# Patient Record
Sex: FEMALE | Race: BLACK OR AFRICAN AMERICAN | NOT HISPANIC OR LATINO | Employment: OTHER | URBAN - METROPOLITAN AREA
[De-identification: names, ages, dates, MRNs, and addresses within clinical notes are randomized per-mention and may not be internally consistent; named-entity substitution may affect disease eponyms.]

---

## 2024-02-04 ENCOUNTER — HOSPITAL ENCOUNTER (INPATIENT)
Facility: CLINIC | Age: 39
LOS: 2 days | Discharge: HOME | DRG: 769 | End: 2024-02-06
Attending: OBSTETRICS & GYNECOLOGY | Admitting: OBSTETRICS & GYNECOLOGY

## 2024-02-04 LAB
ABO/RH(D): NORMAL
ANTIBODY SCREEN: NEGATIVE
APTT PPP: 30 SECONDS (ref 22–38)
D DIMER PPP FEU-MCNC: 3.78 UG/ML FEU (ref 0–0.5)
ERYTHROCYTE [DISTWIDTH] IN BLOOD BY AUTOMATED COUNT: 15.5 % (ref 10–15)
FERRITIN SERPL-MCNC: 11 NG/ML (ref 6–175)
FIBRINOGEN PPP-MCNC: 441 MG/DL (ref 170–490)
HBV SURFACE AB SERPL IA-ACNC: 3.55 M[IU]/ML
HBV SURFACE AB SERPL IA-ACNC: NONREACTIVE M[IU]/ML
HBV SURFACE AG SERPL QL IA: NONREACTIVE
HCT VFR BLD AUTO: 25.4 % (ref 35–47)
HCV AB SERPL QL IA: NONREACTIVE
HGB BLD-MCNC: 8 G/DL (ref 11.7–15.7)
HIV 1+2 AB+HIV1 P24 AG SERPL QL IA: NONREACTIVE
HOLD SPECIMEN: NORMAL
MCH RBC QN AUTO: 25.7 PG (ref 26.5–33)
MCHC RBC AUTO-ENTMCNC: 31.5 G/DL (ref 31.5–36.5)
MCV RBC AUTO: 82 FL (ref 78–100)
PLATELET # BLD AUTO: 147 10E3/UL (ref 150–450)
RBC # BLD AUTO: 3.11 10E6/UL (ref 3.8–5.2)
SPECIMEN EXPIRATION DATE: NORMAL
WBC # BLD AUTO: 13.3 10E3/UL (ref 4–11)

## 2024-02-04 PROCEDURE — 36415 COLL VENOUS BLD VENIPUNCTURE: CPT

## 2024-02-04 PROCEDURE — 59414 DELIVER PLACENTA: CPT | Mod: GC | Performed by: OBSTETRICS & GYNECOLOGY

## 2024-02-04 PROCEDURE — 87340 HEPATITIS B SURFACE AG IA: CPT

## 2024-02-04 PROCEDURE — 82728 ASSAY OF FERRITIN: CPT

## 2024-02-04 PROCEDURE — 86780 TREPONEMA PALLIDUM: CPT

## 2024-02-04 PROCEDURE — 85610 PROTHROMBIN TIME: CPT

## 2024-02-04 PROCEDURE — 85027 COMPLETE CBC AUTOMATED: CPT

## 2024-02-04 PROCEDURE — 85384 FIBRINOGEN ACTIVITY: CPT

## 2024-02-04 PROCEDURE — 250N000011 HC RX IP 250 OP 636

## 2024-02-04 PROCEDURE — 250N000013 HC RX MED GY IP 250 OP 250 PS 637

## 2024-02-04 PROCEDURE — 86803 HEPATITIS C AB TEST: CPT

## 2024-02-04 PROCEDURE — 85379 FIBRIN DEGRADATION QUANT: CPT

## 2024-02-04 PROCEDURE — 88307 TISSUE EXAM BY PATHOLOGIST: CPT | Mod: TC

## 2024-02-04 PROCEDURE — 86762 RUBELLA ANTIBODY: CPT

## 2024-02-04 PROCEDURE — 86706 HEP B SURFACE ANTIBODY: CPT

## 2024-02-04 PROCEDURE — 87389 HIV-1 AG W/HIV-1&-2 AB AG IA: CPT

## 2024-02-04 PROCEDURE — 88307 TISSUE EXAM BY PATHOLOGIST: CPT | Mod: 26 | Performed by: PATHOLOGY

## 2024-02-04 PROCEDURE — 120N000002 HC R&B MED SURG/OB UMMC

## 2024-02-04 PROCEDURE — 10D17Z9 MANUAL EXTRACTION OF PRODUCTS OF CONCEPTION, RETAINED, VIA NATURAL OR ARTIFICIAL OPENING: ICD-10-PCS | Performed by: OBSTETRICS & GYNECOLOGY

## 2024-02-04 PROCEDURE — 250N000009 HC RX 250

## 2024-02-04 PROCEDURE — 86900 BLOOD TYPING SEROLOGIC ABO: CPT

## 2024-02-04 PROCEDURE — 85730 THROMBOPLASTIN TIME PARTIAL: CPT

## 2024-02-04 RX ORDER — HYDROCORTISONE 25 MG/G
CREAM TOPICAL 3 TIMES DAILY PRN
Status: DISCONTINUED | OUTPATIENT
Start: 2024-02-04 | End: 2024-02-06 | Stop reason: HOSPADM

## 2024-02-04 RX ORDER — CARBOPROST TROMETHAMINE 250 UG/ML
250 INJECTION, SOLUTION INTRAMUSCULAR
Status: DISCONTINUED | OUTPATIENT
Start: 2024-02-04 | End: 2024-02-06 | Stop reason: HOSPADM

## 2024-02-04 RX ORDER — FENTANYL CITRATE 50 UG/ML
INJECTION, SOLUTION INTRAMUSCULAR; INTRAVENOUS
Status: COMPLETED
Start: 2024-02-04 | End: 2024-02-04

## 2024-02-04 RX ORDER — MODIFIED LANOLIN
OINTMENT (GRAM) TOPICAL
Status: DISCONTINUED | OUTPATIENT
Start: 2024-02-04 | End: 2024-02-06 | Stop reason: HOSPADM

## 2024-02-04 RX ORDER — OXYTOCIN 10 [USP'U]/ML
10 INJECTION, SOLUTION INTRAMUSCULAR; INTRAVENOUS
Status: COMPLETED | OUTPATIENT
Start: 2024-02-04 | End: 2024-02-04

## 2024-02-04 RX ORDER — IBUPROFEN 800 MG/1
800 TABLET, FILM COATED ORAL EVERY 6 HOURS PRN
Status: DISCONTINUED | OUTPATIENT
Start: 2024-02-04 | End: 2024-02-06 | Stop reason: HOSPADM

## 2024-02-04 RX ORDER — MISOPROSTOL 200 UG/1
400 TABLET ORAL
Status: DISCONTINUED | OUTPATIENT
Start: 2024-02-04 | End: 2024-02-06 | Stop reason: HOSPADM

## 2024-02-04 RX ORDER — TRANEXAMIC ACID 10 MG/ML
1 INJECTION, SOLUTION INTRAVENOUS EVERY 30 MIN PRN
Status: DISCONTINUED | OUTPATIENT
Start: 2024-02-04 | End: 2024-02-06 | Stop reason: HOSPADM

## 2024-02-04 RX ORDER — OXYTOCIN/0.9 % SODIUM CHLORIDE 30/500 ML
340 PLASTIC BAG, INJECTION (ML) INTRAVENOUS CONTINUOUS PRN
Status: COMPLETED | OUTPATIENT
Start: 2024-02-04 | End: 2024-02-04

## 2024-02-04 RX ORDER — METHYLERGONOVINE MALEATE 0.2 MG/ML
INJECTION INTRAVENOUS
Status: COMPLETED
Start: 2024-02-04 | End: 2024-02-04

## 2024-02-04 RX ORDER — METHYLERGONOVINE MALEATE 0.2 MG/ML
200 INJECTION INTRAVENOUS
Status: DISCONTINUED | OUTPATIENT
Start: 2024-02-04 | End: 2024-02-06 | Stop reason: HOSPADM

## 2024-02-04 RX ORDER — OXYTOCIN 10 [USP'U]/ML
INJECTION, SOLUTION INTRAMUSCULAR; INTRAVENOUS
Status: DISCONTINUED
Start: 2024-02-04 | End: 2024-02-04 | Stop reason: HOSPADM

## 2024-02-04 RX ORDER — MISOPROSTOL 200 UG/1
TABLET ORAL
Status: DISCONTINUED
Start: 2024-02-04 | End: 2024-02-04 | Stop reason: HOSPADM

## 2024-02-04 RX ORDER — DOCUSATE SODIUM 100 MG/1
100 CAPSULE, LIQUID FILLED ORAL DAILY
Status: DISCONTINUED | OUTPATIENT
Start: 2024-02-04 | End: 2024-02-06 | Stop reason: HOSPADM

## 2024-02-04 RX ORDER — LIDOCAINE HYDROCHLORIDE 10 MG/ML
INJECTION, SOLUTION EPIDURAL; INFILTRATION; INTRACAUDAL; PERINEURAL
Status: DISCONTINUED
Start: 2024-02-04 | End: 2024-02-04 | Stop reason: WASHOUT

## 2024-02-04 RX ORDER — PHYTONADIONE 1 MG/.5ML
INJECTION, EMULSION INTRAMUSCULAR; INTRAVENOUS; SUBCUTANEOUS
Status: DISCONTINUED
Start: 2024-02-04 | End: 2024-02-04 | Stop reason: HOSPADM

## 2024-02-04 RX ORDER — CEFAZOLIN SODIUM 1 G/3ML
1 INJECTION, POWDER, FOR SOLUTION INTRAMUSCULAR; INTRAVENOUS ONCE
Status: COMPLETED | OUTPATIENT
Start: 2024-02-04 | End: 2024-02-04

## 2024-02-04 RX ORDER — MISOPROSTOL 200 UG/1
800 TABLET ORAL
Status: DISCONTINUED | OUTPATIENT
Start: 2024-02-04 | End: 2024-02-06 | Stop reason: HOSPADM

## 2024-02-04 RX ORDER — LOPERAMIDE HCL 2 MG
2 CAPSULE ORAL
Status: DISCONTINUED | OUTPATIENT
Start: 2024-02-04 | End: 2024-02-06 | Stop reason: HOSPADM

## 2024-02-04 RX ORDER — ACETAMINOPHEN 325 MG/1
650 TABLET ORAL EVERY 4 HOURS PRN
Status: DISCONTINUED | OUTPATIENT
Start: 2024-02-04 | End: 2024-02-06 | Stop reason: HOSPADM

## 2024-02-04 RX ORDER — OXYTOCIN/0.9 % SODIUM CHLORIDE 30/500 ML
PLASTIC BAG, INJECTION (ML) INTRAVENOUS
Status: DISCONTINUED
Start: 2024-02-04 | End: 2024-02-04 | Stop reason: HOSPADM

## 2024-02-04 RX ORDER — BISACODYL 10 MG
10 SUPPOSITORY, RECTAL RECTAL DAILY PRN
Status: DISCONTINUED | OUTPATIENT
Start: 2024-02-04 | End: 2024-02-06 | Stop reason: HOSPADM

## 2024-02-04 RX ORDER — LOPERAMIDE HCL 2 MG
4 CAPSULE ORAL
Status: DISCONTINUED | OUTPATIENT
Start: 2024-02-04 | End: 2024-02-06 | Stop reason: HOSPADM

## 2024-02-04 RX ORDER — ERYTHROMYCIN 5 MG/G
OINTMENT OPHTHALMIC
Status: DISCONTINUED
Start: 2024-02-04 | End: 2024-02-04 | Stop reason: HOSPADM

## 2024-02-04 RX ADMIN — IBUPROFEN 800 MG: 800 TABLET, FILM COATED ORAL at 20:59

## 2024-02-04 RX ADMIN — OXYTOCIN 10 UNITS: 10 INJECTION INTRAVENOUS at 19:23

## 2024-02-04 RX ADMIN — FENTANYL CITRATE 100 MCG: 50 INJECTION INTRAMUSCULAR; INTRAVENOUS at 19:25

## 2024-02-04 RX ADMIN — ACETAMINOPHEN 650 MG: 325 TABLET, FILM COATED ORAL at 22:04

## 2024-02-04 RX ADMIN — METHYLERGONOVINE MALEATE 200 MCG: 0.2 INJECTION INTRAVENOUS at 19:40

## 2024-02-04 RX ADMIN — Medication 340 ML/HR: at 19:36

## 2024-02-04 RX ADMIN — MISOPROSTOL 800 MCG: 200 TABLET ORAL at 19:40

## 2024-02-04 RX ADMIN — CEFAZOLIN 1 G: 1 INJECTION, POWDER, FOR SOLUTION INTRAMUSCULAR; INTRAVENOUS at 20:58

## 2024-02-04 RX ADMIN — TRANEXAMIC ACID 1 G: 10 INJECTION, SOLUTION INTRAVENOUS at 19:26

## 2024-02-04 ASSESSMENT — ACTIVITIES OF DAILY LIVING (ADL)
ADLS_ACUITY_SCORE: 35
ADLS_ACUITY_SCORE: 35

## 2024-02-05 ENCOUNTER — APPOINTMENT (OUTPATIENT)
Dept: INTERPRETER SERVICES | Facility: CLINIC | Age: 39
DRG: 769 | End: 2024-02-05

## 2024-02-05 LAB
ERYTHROCYTE [DISTWIDTH] IN BLOOD BY AUTOMATED COUNT: 15.2 % (ref 10–15)
HCT VFR BLD AUTO: 20.8 % (ref 35–47)
HGB BLD-MCNC: 6.6 G/DL (ref 11.7–15.7)
MCH RBC QN AUTO: 25.7 PG (ref 26.5–33)
MCHC RBC AUTO-ENTMCNC: 31.7 G/DL (ref 31.5–36.5)
MCV RBC AUTO: 81 FL (ref 78–100)
PLATELET # BLD AUTO: 155 10E3/UL (ref 150–450)
RBC # BLD AUTO: 2.57 10E6/UL (ref 3.8–5.2)
RUBV IGG SERPL QL IA: 1.64 INDEX
RUBV IGG SERPL QL IA: POSITIVE
T PALLIDUM AB SER QL: NONREACTIVE
WBC # BLD AUTO: 13.6 10E3/UL (ref 4–11)

## 2024-02-05 PROCEDURE — 258N000003 HC RX IP 258 OP 636

## 2024-02-05 PROCEDURE — 85027 COMPLETE CBC AUTOMATED: CPT

## 2024-02-05 PROCEDURE — 36415 COLL VENOUS BLD VENIPUNCTURE: CPT

## 2024-02-05 PROCEDURE — 120N000002 HC R&B MED SURG/OB UMMC

## 2024-02-05 PROCEDURE — 250N000011 HC RX IP 250 OP 636

## 2024-02-05 PROCEDURE — 250N000013 HC RX MED GY IP 250 OP 250 PS 637

## 2024-02-05 RX ORDER — METHYLPREDNISOLONE SODIUM SUCCINATE 125 MG/2ML
125 INJECTION, POWDER, LYOPHILIZED, FOR SOLUTION INTRAMUSCULAR; INTRAVENOUS
Status: DISCONTINUED | OUTPATIENT
Start: 2024-02-05 | End: 2024-02-06 | Stop reason: HOSPADM

## 2024-02-05 RX ORDER — ACETAMINOPHEN 325 MG/1
650 TABLET ORAL EVERY 6 HOURS PRN
Qty: 100 TABLET | Refills: 0 | Status: SHIPPED | OUTPATIENT
Start: 2024-02-05

## 2024-02-05 RX ORDER — DIPHENHYDRAMINE HYDROCHLORIDE 50 MG/ML
50 INJECTION INTRAMUSCULAR; INTRAVENOUS
Status: DISCONTINUED | OUTPATIENT
Start: 2024-02-05 | End: 2024-02-06 | Stop reason: HOSPADM

## 2024-02-05 RX ORDER — SIMETHICONE 80 MG
80 TABLET,CHEWABLE ORAL EVERY 6 HOURS PRN
Status: DISCONTINUED | OUTPATIENT
Start: 2024-02-05 | End: 2024-02-06 | Stop reason: HOSPADM

## 2024-02-05 RX ORDER — AMOXICILLIN 250 MG
1 CAPSULE ORAL DAILY
Qty: 100 TABLET | Refills: 0 | Status: SHIPPED | OUTPATIENT
Start: 2024-02-05

## 2024-02-05 RX ORDER — IBUPROFEN 600 MG/1
600 TABLET, FILM COATED ORAL EVERY 6 HOURS PRN
Qty: 60 TABLET | Refills: 0 | Status: SHIPPED | OUTPATIENT
Start: 2024-02-05

## 2024-02-05 RX ORDER — FERROUS SULFATE 325(65) MG
325 TABLET, DELAYED RELEASE (ENTERIC COATED) ORAL EVERY OTHER DAY
Qty: 90 TABLET | Refills: 0 | Status: SHIPPED | OUTPATIENT
Start: 2024-02-05

## 2024-02-05 RX ADMIN — IRON SUCROSE 300 MG: 20 INJECTION, SOLUTION INTRAVENOUS at 13:28

## 2024-02-05 RX ADMIN — ACETAMINOPHEN 650 MG: 325 TABLET, FILM COATED ORAL at 20:20

## 2024-02-05 RX ADMIN — ACETAMINOPHEN 650 MG: 325 TABLET, FILM COATED ORAL at 10:33

## 2024-02-05 RX ADMIN — ACETAMINOPHEN 650 MG: 325 TABLET, FILM COATED ORAL at 16:06

## 2024-02-05 RX ADMIN — BENZOCAINE AND LEVOMENTHOL: 200; 5 SPRAY TOPICAL at 06:51

## 2024-02-05 RX ADMIN — IBUPROFEN 800 MG: 800 TABLET, FILM COATED ORAL at 10:33

## 2024-02-05 RX ADMIN — DOCUSATE SODIUM 100 MG: 100 CAPSULE, LIQUID FILLED ORAL at 07:49

## 2024-02-05 RX ADMIN — IBUPROFEN 800 MG: 800 TABLET, FILM COATED ORAL at 18:26

## 2024-02-05 RX ADMIN — SIMETHICONE 80 MG: 80 TABLET, CHEWABLE ORAL at 02:04

## 2024-02-05 RX ADMIN — ACETAMINOPHEN 650 MG: 325 TABLET, FILM COATED ORAL at 06:56

## 2024-02-05 RX ADMIN — ACETAMINOPHEN 650 MG: 325 TABLET, FILM COATED ORAL at 01:53

## 2024-02-05 RX ADMIN — IBUPROFEN 800 MG: 800 TABLET, FILM COATED ORAL at 04:12

## 2024-02-05 ASSESSMENT — ACTIVITIES OF DAILY LIVING (ADL)
ADLS_ACUITY_SCORE: 35

## 2024-02-05 NOTE — PLAN OF CARE
Vitals & PP assessments within normal range. Pt appears weak on her R leg-when out of bed.    Up ad any & Asymptomatic with low Hemoglobin.   Breast feeding independently. Planning to soak/shower later today.   Will continue on plan of cares.     Goal Outcome Evaluation: Improving      Plan of Care Reviewed With: patient, spouse    Overall Patient Progress: improvingOverall Patient Progress: improving

## 2024-02-05 NOTE — PROGRESS NOTES
Data: Jessenia Dueñas transferred to [facility] via wheelchair at 2130. Baby transferred via parent's arms.  Action: Receiving unit notified of transfer: Yes. Patient and family notified of room change. Report given to Dhara Velazquez RN at 2135. Belongings sent to receiving unit. Accompanied by Registered Nurse. Oriented patient to surroundings. Call light within reach. ID bands double-checked with receiving RN.  Response: Patient tolerated transfer and is stable.

## 2024-02-05 NOTE — DISCHARGE SUMMARY
VAGINAL DELIVERY DISCHARGE SUMMARY    Jessenia Dueñas  : 1985  MRN: [id number]    Admit date: 2024  Discharge date: 2024    Admit Dx:   - 39 year old  s/p home   - PN in [facility]    Discharge Dx:  - Same as above  - Retained placental tissue  - Acute on chronic blood loss anemia    Procedures:  - Manual sweep    Admit HPI:  Ms. Jessenia Dueñas is a 39 year old  coming in today s/p home . Placenta delivered in the ambulance. Admitted for postpartum care.  Please see her admit H&P for full details of her PMH, PSH, Meds, Allergies and exam on admit.    Consults:  Social work  Lactation    Hospital course:  Jessenia Dueñas was admitted to the hospital on 2024 for the above listed indications. At the time of presentation, she was having ongoing heavy vaginal bleeding due to evidence of retained placental tissue. Evidence of infibulation. Ongoing bogginess with evidence of placental tissue at introitus. Sweep x3 performed with removal of multiple pieces of placenta. BSUS confirmed thin stripe. Pitocin, TXA, methergine, CT misoprostol administered. Fundus firm and minimal bleeding noted with fundal massage. Hemostatic 1st degree perineal tear unrepaired. Tear through superior infibulation hemostatic and therefore not repaired.   Please see her Delivery Summary for full details regarding her delivery.    Her hemoglobin decreased to 6.6 postpartum (from 8.0), due to acute blood loss superimposed on chronic anemia. She declined a blood transfusion, and she received IV iron. Her postpartum course was otherwise uncomplicated. On PPD#2, she was meeting all of her postpartum goals and deemed stable for discharge. She was voiding without difficulty, tolerating a regular diet without nausea and vomiting, her pain was well controlled on oral pain medicines and her lochia was appropriate. Her Rh status was positive, and Rhogam was not indicated. She was rubella immune and a vaccine was therefore not  indicated.    HGB  Recent Labs   Lab 02/05/24  0757 02/04/24 2027   HGB 6.6* 8.0*       Discharge Medications:     Review of your medicines        START taking        Dose / Directions   acetaminophen 325 MG tablet  Commonly known as: TYLENOL      Dose: 650 mg  Take 2 tablets (650 mg) by mouth every 6 hours as needed for mild pain Start after Delivery.  Quantity: 100 tablet  Refills: 0     ferrous sulfate 325 (65 Fe) MG EC tablet  Commonly known as: FE TABS      Dose: 325 mg  Take 1 tablet (325 mg) by mouth every other day  Quantity: 90 tablet  Refills: 0     ibuprofen 600 MG tablet  Commonly known as: ADVIL/MOTRIN      Dose: 600 mg  Take 1 tablet (600 mg) by mouth every 6 hours as needed for moderate pain Start after delivery  Quantity: 60 tablet  Refills: 0     senna-docusate 8.6-50 MG tablet  Commonly known as: SENOKOT-S/PERICOLACE      Dose: 1 tablet  Take 1 tablet by mouth daily Start after delivery.  Quantity: 100 tablet  Refills: 0               Where to get your medicines        These medications were sent to [facility] [address]  [address] [address]      Phone: [phone]   acetaminophen 325 MG tablet  ferrous sulfate 325 (65 Fe) MG EC tablet  ibuprofen 600 MG tablet  senna-docusate 8.6-50 MG tablet       Discharge/Disposition:  Jessenia Dueñas was discharged to home in stable condition with the following instructions/medications:  1) Call for temperature > 100.4, bright red vaginal bleeding >1 pad an hour x 2 hours, foul smelling vaginal discharge, pain not controlled by usual oral pain meds, persistent nausea and vomiting not controlled on medications  2) She declined contraception.  3) For feeding she decided to breast and bottle feed.  4) She was instructed to follow-up with her primary OB in 6 weeks for a routine postpartum visit.    Carli Goodwin MD  OB/GYN PGY-2  02/06/2024 9:06 AM    The patient was seen and examined by me on the day  of discharge.  I have reviewed and agree with the resident's above note.    Meagan Wilson MD, FACOG

## 2024-02-05 NOTE — PROGRESS NOTES
"Social Work Consult    SW received order for consult for \"maternal care\" with a note that patient had been in the [place] for less than 48 hours prior to delivery.     ABBEY visited with Jessenia in her postpartum room after the birth of her 5th child, a girl, whom she has chosen to name \"Yannick.\" Jessenia welcomed visit from ABBEY. She talked about the birth of her baby, which happened at home, though she was supported by EMS as she had called an ambulance. She reports that it went smoothly and her baby girl was \"eager to be born.\"    Jessenia reports that she is new to [place], but she has actually been living in [place] previously. She reports she is living in [place] with her , Sanya, who is supportive and helping her navigate being here and having her baby.     Jessenia reports that she has an appointment tomorrow regarding obtaining medical insurance in [place]. She also has plans to purchase baby supplies and necessities including a car seat. She declined needing any support or help from ABBEY for this.     Jessenia denied having any questions or needs for SW. ABBEY encouraged her to let her RN or provider know if she thinks of something and would like SW to come back. Jessenia expressed understanding.     Please re-consult ABBEY if any additional needs arise for patient or her baby prior to discharge.     ZAK Vivar, [facility]  Maternal and Child Health   Office: [phone]  Pager: [phone]  After Hours Pager: [phone]  [email or web address]        "

## 2024-02-05 NOTE — PROVIDER NOTIFICATION
02/04/24 9883   Provider Notification   Provider Name/Title G3 Boaz   Method of Notification Electronic Page   Request Evaluate-Remote   Notification Reason Lab Results     FYI, Hemoglobin = 8.0. Pt reports she feels slightly weak, but no lightheadedness or dizziness.

## 2024-02-05 NOTE — PROGRESS NOTES
Patient arrived to [facility] unit via wheelchair, with belongings, accompanied by Isatu Escobar RN, with infant in arms. Got report from Isatu and checked bands. Unit and room orientation completed. No concerns a this time. Continue with plan of care.

## 2024-02-05 NOTE — PROGRESS NOTES
Pt arrived by EMS already delivered. According to pt she delivered in the hallway of her apartment building and called 911. Fundus firm at arrival but multiple clots expressed. Providers were at bedside for post-partum hemorrhage. Fundus firm and midline with scant bleeding after administration of medications (See MAR).

## 2024-02-05 NOTE — H&P
OB HISTORY AND PHYSICAL    Patient: Jessenia Dueñas   MRN#: [id number]  YOB: 1985     HPI: Jessenia Dueñas is a 39 year old  coming in today s/p home . Placenta delivered in the ambulance.    Reports due date of . Contractions started earlier today. Water broke just before delivery.     She denies fever, chills, headache, vision changes, SOB, chest pain, palpitations, nausea, emesis, RUQ pain, epigastric pain, dysuria, change in vaginal discharge, LE swelling/tenderness.      Pregnancy notable for:   - PNC in [facility]    Her previous pregnancies were notable for  x4 last about 10 years ago. Her deliveries were uncomplicated. Denies history of postpartum hemorrhage.    No history of asthma or high blood pressure or diabetes.    Prenatal Lab Results:  Patient Active Problem List    Diagnosis Date Noted     (spontaneous vaginal delivery) 2024     Priority: Medium       HISTORY  No past medical history on file.    No past surgical history on file.    No family history on file.         No medications prior to admission.       No Known Allergies     REVIEW OF SYSTEMS:  A 10 point review of systems was completed and was negative other than as noted in the HPI.    PHYSICAL EXAM  Pulse: 96, /70  Gen: NAD  CV: RRR, well perfused  Lungs: CTAB, non-labored breathing on room air  Abd: Non-tender, non-distended, soft  Ext: Trace peripheral extremity edema    : Evidence of infibulation. Ongoing bogginess with evidence of placental tissue at introitus. Sweep x3 performed with removal of multiple pieces of placenta. BSUS confirmed thin stripe. Pitocin, TXA, methergine, [place] misoprostol administered. Fundus firm and minimal bleeding noted with fundal massage. Hemostatic 1st degree perineal tear unrepaired. Tear through superior infibulation hemostatic and therefore not repaired.    Studies: T&S, CBC, RPR, PNL    Assessment & Plan: 39 year old  coming in today s/p home . Placenta delivered in  the ambulance. Admitted for postpartum care.     # S/p   -  mL and improved with the above interventions.  - Routine postpartum cares.    # PNC in [place]  # [place] move to [place]  - PNL ordered  - SW consult PP       Patient seen and discussed with Dr. Noyola who was present for the above procedures.    Pedro Pablo Snadra MD MPH  OBGYN Resident, PGY-3    Appreciate Dr. Sandra's note above, patient also seen and examined by me along with the resident. I agree with the note above.   Madeline Noyola MD

## 2024-02-05 NOTE — PROGRESS NOTES
[facility]   Post-partum Note    Name:  Jessenia Dueñas  MRN: [id number]    S: Patient is feeling well overall.  Pain well controlled.  Tolerating regular diet without nausea or vomiting.  Ambulating without dizziness.  Lochia appropriate.  Breast feeding.      O:   Patient Vitals for the past 24 hrs:   BP Temp Temp src Pulse Resp SpO2   24 0634 116/64 97.6  F (36.4  C) Oral 88 16 100 %   243 127/56 99.1  F (37.3  C) Oral 82 18 99 %   24 109/59 -- -- -- -- --   24 107/70 98.1  F (36.7  C) -- -- -- --   24 133/72 -- -- -- -- --   24 191 131/59 -- -- -- -- --     Gen:  Resting comfortably, NAD  CV:  RR, well perfused  Pulm:  Breathing comfortably on room air  Abd:  Soft, appropriately ttp, non-distended. Fundus below umbilicus, firm and non-tender.  Ext:  Trace LE edema b/l    I/O last 3 completed shifts:  In: -   Out: 1225 [Urine:1225]    Hgb:   Hemoglobin   Date Value Ref Range Status   2024 8.0 (L) 11.7 - 15.7 g/dL Final       Assessment/Plan:  Jessenia Dueñas 39 year old  on PPD#1 s/p home . Doing well in immediate PP period.    # Postpartum management  Pain: Well-controlled with ibuprofen and tylenol  Hgb:  (TXA, mthrg, [place] miso, sweep s/p Ancef)> 8. AM Hgb pending.  Rh: Positive  Rubella: Pending  GI: PRN bowel regimen ordered  Feed: Breast  BC: Need to discuss    # PNC in [place]  - PNL pending  - SW consult ordered  - Hep B NI, vax ordered    # TCP  - 147 following delivery  - Repeat pending this  morning    # Elevated D-dimer  - Order inaccurately released. No concern for DVT/PE at this time, suspect physiologic elevation related to pregnancy.      Dispo: Anticipate DC PPD#1-2.    Pedro Pablo Sandra MD PGY3  Obstetrics & Gynecology    Appreciate note by Dr. Sandra. Patient has been seen and examined by me separate from the resident, agree with above note. Having some perineal pain/pressure, recommend tub soaks this am.  Recheck Hgb this am, will likely do IV iron today.     Meredith Cavanaugh MD  8:20 AM      Addendum: Hgb returned 6.6. Blood transfusion recommended, she declined and agrees to IV iron. Discussed with [facility] .     Meredith Cavanaugh MD

## 2024-02-05 NOTE — PROVIDER NOTIFICATION
02/05/24 0846   Provider Notification   Provider Name/Title G2 Gisele Michelle   Method of Notification Electronic Page   Request Evaluate-Remote   Notification Reason Lab Results     Pt's HGB is 6.6. Pt says she is Asymptomatic.     Per Dr Michelle, will come & talk to the Pt about Blood Transfusion.

## 2024-02-05 NOTE — PLAN OF CARE
Goal Outcome Evaluation:       VSS. Fundus midline, firm, and 1 below umbilicus. Pain adequately managed with tylenol and ibuprofen. Voiding without difficulty, had two adequate measured voids. Breastfeeding with some support with good latch and also bottle feeding formula per parent preference. Bonding well with . Continue with plan of care.

## 2024-02-05 NOTE — LACTATION NOTE
This note was copied from a baby's chart.  Consult for:  Lactation consult placed for education / support with feeding plan which includes breast and bottles of formula (as desired by Jessenia and this is her plan for feeding). Family is from [facility], that is where Jessenia received pre-.    Used [place]  during visit and Jessenia had no concerns / no questions. She is aware to call for assistance as needed during her hospital stay. Jessenia showed little interest in lactation support and appears to be independent with her feedings.      Delivery Information:  infant girl was born at Gestational Age: 38w4d via vaginal delivery, spontaneous at home on 2024 6:36 PM     Maternal Health History:    Information for the patient's mother:  Jessenia Dueñas [[id number]]   No past medical history on file. ,   Information for the patient's mother:  SpenserJessenia [[id number]]     Patient Active Problem List   Diagnosis     (spontaneous vaginal delivery)    ,   Information for the patient's mother:  Jessenia Dueñas [[id number]]     No medications prior to admission.    ,       Maternal Breast Exam:  breastfeeding during visit. Jessenia states she is independent and appears comfortable with latching / feeding her infant.    Breastfeeding/ Lactation History: appears to have no issues with feeding, she is latching and feeding her infant independently. Jessenia has no questions / no concerns.    Infant information: infant was AGA at birth and has age appropriate output and weight loss.      Weight Change Since Birth: 0% at 1 day old (less than 24 hours of age)    Feeding Assessment:  infant is feeding without issues. No oral exam done. She appears to be engaged in a normal, nutritive sucking pattern on the left breast during our visit. Jessenia denies any pain with breastfeeding.    Jessenia is aware of support in hospital during       Education:     [x] Inpatient breastfeeding support      Home Breast Pump: Jessenia would like a pump  at discharge, she doesn't have one.     Plan: Continue breastfeeding on cue with RN support as needed with a goal of 8-12 feedings per day.     Encouraged follow up with outpatient lactation consultant  as needed after discharge.           Flavia Rodríguez RN, IBCLC   Lactation Consultant  Irene: [facility] [phone]  Office: [phone]

## 2024-02-06 VITALS
WEIGHT: 169.31 LBS | TEMPERATURE: 97.7 F | DIASTOLIC BLOOD PRESSURE: 70 MMHG | HEART RATE: 80 BPM | RESPIRATION RATE: 18 BRPM | SYSTOLIC BLOOD PRESSURE: 113 MMHG | OXYGEN SATURATION: 99 %

## 2024-02-06 PROCEDURE — 250N000011 HC RX IP 250 OP 636

## 2024-02-06 PROCEDURE — G0010 ADMIN HEPATITIS B VACCINE: HCPCS

## 2024-02-06 PROCEDURE — 250N000013 HC RX MED GY IP 250 OP 250 PS 637

## 2024-02-06 PROCEDURE — 90746 HEPB VACCINE 3 DOSE ADULT IM: CPT

## 2024-02-06 RX ADMIN — HEPATITIS B VACCINE (RECOMBINANT) 20 MCG: 20 INJECTION, SUSPENSION INTRAMUSCULAR at 09:05

## 2024-02-06 RX ADMIN — ACETAMINOPHEN 650 MG: 325 TABLET, FILM COATED ORAL at 00:02

## 2024-02-06 RX ADMIN — IBUPROFEN 800 MG: 800 TABLET, FILM COATED ORAL at 00:02

## 2024-02-06 RX ADMIN — DOCUSATE SODIUM 100 MG: 100 CAPSULE, LIQUID FILLED ORAL at 08:11

## 2024-02-06 ASSESSMENT — ACTIVITIES OF DAILY LIVING (ADL)
ADLS_ACUITY_SCORE: 35

## 2024-02-06 NOTE — PLAN OF CARE
Goal Outcome Evaluation:  Pt stable for discharge. DIscharge instructions gone over with the patient and spouse, verbalized understanding. Discharge medications were given by previous RN. Discharge home with family.         Overall Patient Progress: improvingOverall Patient Progress: improving

## 2024-02-06 NOTE — PROGRESS NOTES
[facility]   Postpartum Note    Name:  Jessenia Dueñas  MRN: [id number]    Patient seen with 10[employer] phone     S: Patient is feeling well overall.  Pain well controlled.  Tolerating regular diet without nausea or vomiting.  Ambulating without dizziness.  Denies chest pain, SOB, lightheadedness.  Lochia appropriate.  Breast and bottle feeding.     O:   Patient Vitals for the past 24 hrs:   BP Temp Temp src Pulse Resp SpO2   24 0737 113/70 97.7  F (36.5  C) Oral 80 18 --   24 2327 100/51 97.6  F (36.4  C) Oral 76 18 --   24 1500 105/58 98.2  F (36.8  C) Oral 92 18 99 %   24 1445 107/62 -- -- 81 18 100 %   24 1430 101/63 -- -- 79 17 100 %   24 1415 113/65 -- -- 86 17 99 %   24 1400 122/75 -- -- 103 16 100 %   24 1345 110/54 -- -- 85 18 99 %   24 1328 106/54 98  F (36.7  C) -- 81 16 98 %   24 1200 118/73 98  F (36.7  C) Oral 86 18 --   24 0830 122/65 98.3  F (36.8  C) Oral 89 18 98 %     Gen:  Resting comfortably, NAD  CV:  RR, well perfused  Pulm:  Breathing comfortably on room air  Abd:  Soft, appropriately ttp, non-distended. Fundus below umbilicus, firm and non-tender.  Ext:  Trace LE edema b/l    I/O last 3 completed shifts:  In: -   Out: 1 [Urine:1]    Hgb:   Hemoglobin   Date Value Ref Range Status   2024 6.6 (LL) 11.7 - 15.7 g/dL Final       Assessment/Plan:  Jessenia Dueñas 39 year old  on PPD#2 s/p home . Doing well in PP period.    # Postpartum management  Pain: Well controlled with ibuprofen and Tylenol  Hgb:  (TXA, mthrg, [place] miso, sweep s/p Ancef)> 8> 6.6> IV Fe (declined transfusion). Acute blood loss anemia due to delivery superimposed on chronic anemia, declines blood transfusion again today and will discharge with PO iron.  Rh: Positive  Rubella: Immune  GI: PRN bowel regimen ordered  Feed: Breast and bottle  BC: Declines    # PNC in [facility]  -  consult ordered  - Hep B NI, vax  ordered    # TCP  - 147 following delivery > 155    # Elevated D-dimer  - Order inaccurately released. No concern for DVT/PE at this time, suspect physiologic elevation related to pregnancy.    Dispo: Anticipate discharge today.    Carli Goodwin MD  OB/GYN PGY-2  02/06/2024 7:40 AM    /70 (BP Location: Left arm, Patient Position: Sitting, Cuff Size: Adult Regular)   Pulse 80   Temp 97.7  F (36.5  C) (Oral)   Resp 18   Wt 76.8 kg (169 lb 5 oz)   LMP 05/10/2023 (Approximate)   SpO2 99%   Breastfeeding Unknown   Hemoglobin   Date Value Ref Range Status   02/05/2024 6.6 (LL) 11.7 - 15.7 g/dL Final     The patient was seen and examined by me separately from the team.  I have reviewed and agree with the above note.  She is doing well today-continues to decline PRBC transfusion, planning to eat foods high in iron at home and will take and oral supplement.  Discharge to home vs boarding today pending baby's discharge (unknown GBS status).  Follow up at [facility] or [facility] if baby going there for routine postpartum care.     Meagan Wilson MD, FACOG

## 2024-02-06 NOTE — PLAN OF CARE
Vitals & PP assessments within normal range.   Independent with self & baby cares.   Pt Breast feeding well & planning to   be discharge later today.         Goal Outcome Evaluation: Improving       Care Plan reviewed with: Patient    Overall Patient Progress: improvingOverall Patient Progress: improving

## 2024-02-06 NOTE — PLAN OF CARE
Problem: Postpartum (Vaginal Delivery)  Goal: Optimal Pain Control and Function  Outcome: Progressing   Goal Outcome Evaluation:      Plan of Care Reviewed With: patient             VSS. AFebrile. Up ad any. Voiding and passing gas. Breast feeding baby. Dinner ordered for pt. C/o some pain and medicated with relief. Will continue to monitor.

## 2024-02-06 NOTE — PLAN OF CARE
Data: VSS, postpartum assessments WNL. She is voiding without difficulty, up ad any, eating and drinking without nausea. Perineum appears to be healing well, lochia WNL, no s/s infection. Breastfeeding infant / Pumping with min assistance. Taking tylenol and ibuprofen  for  pain and pt reports adequate pain management.   Action: Education provided on discharge goals, plan of care, pain management.  Response: Pt is agreeable with her plan of care. Pt is independent providing  cares and is attentive to infant needs. Support person, not present.  Plan of care ongoing, no concerns as of present.

## 2024-02-06 NOTE — DISCHARGE INSTRUCTIONS
Warning Signs after Having a Baby    Keep this paper on your fridge or somewhere else where you can see it.    Call your provider if you have any of these symptoms up to 12 weeks after having your baby.    Thoughts of hurting yourself or your baby  Pain in your chest or trouble breathing  Severe headache not helped by pain medicine  Eyesight concerns (blurry vision, seeing spots or flashes of light, other changes to eyesight)  Fainting, shaking or other signs of a seizure    Call 9-1-1 if you feel that it is an emergency.     The symptoms below can happen to anyone after giving birth. They can be very serious. Call your provider if you have any of these warning signs.    My provider s phone number: _______________________    Losing too much blood (hemorrhage)    Call your provider if you soak through a pad in less than an hour or pass blood clots bigger than a golf ball. These may be signs that you are bleeding too much.    Blood clots in the legs or lungs    After you give birth, your body naturally clots its blood to help prevent blood loss. Sometimes this increased clotting can happen in other areas of the body, like the legs or lungs. This can block your blood flow and be very dangerous.     Call your provider if you:  Have a red, swollen spot on the back of your leg that is warm or painful when you touch it.   Are coughing up blood.     Infection    Call your provider if you have any of these symptoms:  Fever of 100.4 F (38 C) or higher.  Pain or redness around your stitches if you had an incision.   Any yellow, white, or green fluid coming from places where you had stitches or surgery.    Mood Problems (postpartum depression)    Many people feel sad or have mood changes after having a baby. But for some people, these mood swings are worse.     Call your provider right away if you feel so anxious or nervous that you can't care for yourself or your baby.    Preeclampsia (high blood pressure)    Even if you  "didn't have high blood pressure when you were pregnant, you are at risk for the high blood pressure disease called preeclampsia. This risk can last up to 12 weeks after giving birth.     Call your provider if you have:   Pain on your right side under your rib cage  Sudden swelling in the hands and face    Remember: You know your body. If something doesn't feel right, get medical help.     For informational purposes only. Not to replace the advice of your health care provider. Copyright 2020 [facility]. All rights reserved. Clinically reviewed by Yandy Fuentes, RNC-OB, MSN. RealtyAPX [id number] - Rev 02/23.    Rosa kim dhaca haweenka dhalmada kaddib: Tilmateja Lopez  Postpartum: Care Instructions  Talmaamadanial Lopezaga  Dhalmada ilmaha kadib (Jaynetigpapi dorantesa), isbadal badan ayaa jibradykaaga ku imanaya. Is-baddeladaan qaarkveronica waxay dhacaan dhawr toddobaad. Saacado dhalmada ka danbeeya, jirkaaga wuxuu bilaawayaa in uu ka shanta raysto ilmo dhalida halka uu isu diyaarinayo naasnuujinta ilmahaaga ee [facility]. Waxaa laga yaabaa inaad iska oydo markaad ckadahermelinda ku jirto. Dheecaanadaada ayaa shucuurtaada noy gadin magalis iyadoon marka hore la dareemin amase ayna jirin Sabab keentayi.  Dhawrka toddobaad ee ugu horreeya kaddib ilmo dhalida, haween badani waxay isku arkaan shucuur is gadgadi farxad iyo murugo iskugu jirta. Waxaa kutoribio pena in aad seexatid. Waxaa laga yaabaa inaad wax badan ooysid. Niels waxaa la yiraahdaa \"baby blues.\" Shucuurahaani xad-dhaafka ahi waxay ku tagtaa dhawr maalmood ama dhawr toddobaad gudahooda. Laakin waxaa muhiim ah in aad mireya hadashid dareenkaaga takhtarkaaga.  Waa ay fududahay in aad u daashid si aad ah oo xad-dhaaf ah inta lagu guda jiro toddobaadyada ugu horreeya kadib sallyuur davidda. La isku dayin in aad wax badan sameysid. Naso markasta oo aad awoodid, dadka kale kaalmo waydiiso, si fiicana wax u cun oona cabb cabitaan badan.  4 ilaa 6 todobaad ee ugu horeeya " markaad umusho, waxaad dhakhtarkaaga la yeelan doonaa kulamo xaaladaada lagaga war haynayo. Mudadani waa wakhtiga aad dhakhtarkaaga waydiinayso xaalad kasta oo aad isku aragtay ama wixii aad doonayso inaad ogaatid.  Booqashadani waxay kaalin wayn ka qaadanaysaa dawayntaada iyo badbaadadaada. Markasta waa in aad tagtaa balamaha dhakhtarka, hadii ay jiraan wax mushkilad ahina lasoo hadal. Sidoo kale waxaa fiican in aad oggaatid natiijooyinka shaybaadhkaaga iyo in aad yeelatid liiska qoraalka daawooyinka aad qaadatid.  Sideed iskugu baxnaanin kartaa gurigga?  Seexo ama naso marka ilmuhu uu seexdo.  Hadii aad awoodid, kaalmo waydiiso xubnaha qoyskaaga ama asxaabtaada. Ha isku dayin in dhammaan kaligaa wada sameysid.  Hadii ay ku xanuunto ama ay ku bararto aaga ku xeeran unugaaga taranku, isku inaad isticmaasho qabaw ama kulayl. Waxaad maik kartaa baraf ama xirmo qabow ah goobta 10 ilaa iyo 20 daqiiqadood halkii waqti. U dhaxaysii marayo khafiif ah jidhkaaga iyo barafka. Sidoo kale isku day in aad ku dhex fariisatid dhawr iinji oo biyo diirran ah (qubays fadhi) 3 jeer maalintii iyo kadib dhaq-dhaqaaqa mindhiciirka.  Sida laguu jiheeyay u qaado kaar jabiyaha.  Haddii uu dhakhtarka kuu shanta qoro daawooyin xanuunka, u qaado sidii laguugu qoray.  Haddii aadan qaadanaynin dawo kaar jabiye ah, dhakhtarkaaga mireya tasho inaad qaadan kartid dawooyinka meelahaa lagu iibiyo.  Cun maaddada dhirta ama miraha laga helo si aad isaga ilaalisid calool istaag. Waxaa ka mid ah cuntooyinka sida rootiga qamadiga iyo miguel-dhireed, khudaar cayriin ah, miguel cayriin ama kuwo qaleel ah, iyo digir.  Cab dareere badan, oo kugu filan sidaas awgeed kaadidaada waxay noqonaysaa jaalle khafiif ah ama mid saafi u ah sida biyo oo kale. Haddii aad qabto cudurka kalyaha, wadnaha, ama beerka lagaana rabbo in aad xaddadid dareeraha, waa in aad mireya tashataa dhaMercy Health St. Anne Hospitalkaaga kahor inta aadan kordhinin cadadka dareeraha ee aad cabbeysid.  La biyo-raacinin gudaha  "unuga tarankaaga wax dareere ah (ku dhaqida biyo cusbo xubinta taranka).  Haddii aad boyd dahay tolliimo, ka dhig goobta tolliinka nadiif adigoo ku shibaaya ama ku buufinaaya biyo diiran goobta babaanka unuga tarankaaga iyo futada kadib marka aad musqusha isticmaashid.  Franz'aalo aad waydiiso dhakhtarkaaga dhalmada ah kasoo fikir. Franz'aalahaaga waxaa laga yaabaa in ay ku saabsanaadaan:  Isbadal naasaha ah, sida barar iyo damqasho.  Mar aad mar kale filayso inay caadadu kuu bilaabanto.  Dhalmo baajiyahee kugu haboon adiga.  Miisaanka kugu kordhay intii aad uurka lahayd.  Dariiqooyinka Jimicsiga.  Cuntooyinkee iyo cabitaanadee kuu fiican, khaasatan hadii aad naaska jaqsiinayso.  Mushkiladaha laga yaabo inay shamar shanta darseen intaad naaska nuujinaysay.  Goorta aad galmo samayn karto. Dumarka qaar ahaa doona inay ka sheekeeyaan dareerada ka socda xubintooda taranka.  Wax dareen ah oo murugo ama juuc juuc ah oo aad leedahay.  Goormee ayaad caawimaad u wacan kartaa?  [facility] [address] Wakhti kasta oo aad istidhaahdid waxaad u baahan tahay daryeel gurmad degdeg ah. saale ahaan, [facility] haddii:  Inaas damacsan tahay inaad naftaafa, ilmaha ama mikaela kale wax yeesho.  Aad suuxday ( miyir-beeshay).  [facility]aga ama u raadso daryeel caafimaad sida ugu dhaqsiyaha jermainan haddii:  Dhiiga xubinta taranka kaa socda oo sii batay.  Aad dawakhdo ama dhulka shamar wareegayo, ama aad dareemaysid inaad suuxdid.  Xumad aad yeelato.  Si taxadir leh jalil sai isbadalka caafimaadkaaga, waana in aad dhakhtarkaaga lasoo hadashaa hadii:  Aad boyd dahay waxyaabaha ka yimaad unuga taranka oo cusub ama ka sii daraya.  Aad dareemaysid murugo ama niyad-laureanaan.  Aad dhibaatooyin ku qabtid naasahaaga ama naasnuujinta.  Halkeed ku jarrod kartaa wax badan?  Tag   [email or web address]  Cinda Z768 Gudaha sanduuqa raadinta si loo jesse wax badan oo judy hoskins. \"Walaaca ku dhaca hawColumbia Basin Hospitalka dhalmada kaddib: Jay Lopez.\"  Hadda laga " natanaabo: Messi Tosea 11, 2023               Noodalis Delgadoadda: 13.8    1832-0266 [employer].   Jay dorsey la qaatay hoosta ruqsada xirfad yaqaanyoko dorsey caafimasyed Haddii aad qabto castaneda aalo ricardo matson had iyo kemar xirfad yaqaaelidia cifuentesfimasyed. [employer] cristina lees.

## 2024-02-06 NOTE — PROGRESS NOTES
Data: VSS, postpartum assessments WNL. She is voiding without difficulty, up ad any, eating and drinking without nausea. Perineum appears to be healing well, lochia WNL, no s/s infection. Breastfeeding infant / with min assistance. Taking tylenol and ibuprofen for pain and pt reports adequate pain management.   Action: Education provided on discharge goals, plan of care, pain management.  Response: Pt is agreeable with her plan of care. Pt is independent providing  cares and is attentive to infant needs. Support person,  not present.  Plan of care ongoing, no concerns as of present.

## 2024-02-08 LAB
PATH REPORT.COMMENTS IMP SPEC: NORMAL
PATH REPORT.COMMENTS IMP SPEC: NORMAL
PATH REPORT.FINAL DX SPEC: NORMAL
PATH REPORT.GROSS SPEC: NORMAL
PATH REPORT.MICROSCOPIC SPEC OTHER STN: NORMAL
PATH REPORT.RELEVANT HX SPEC: NORMAL
PHOTO IMAGE: NORMAL

## 2024-02-29 ENCOUNTER — APPOINTMENT (OUTPATIENT)
Dept: INTERPRETER SERVICES | Facility: CLINIC | Age: 39
End: 2024-02-29

## 2024-03-22 LAB — INR PPP: 1.16 (ref 0.85–1.15)

## 2024-08-23 DIAGNOSIS — K21.00 GASTROESOPHAGEAL REFLUX DISEASE WITH ESOPHAGITIS WITHOUT HEMORRHAGE: Primary | ICD-10-CM

## 2024-08-23 RX ORDER — FAMOTIDINE 40 MG/1
40 TABLET, FILM COATED ORAL DAILY
Qty: 90 TABLET | Refills: 1 | Status: SHIPPED | OUTPATIENT
Start: 2024-08-23

## 2025-03-25 ENCOUNTER — OFFICE VISIT (OUTPATIENT)
Dept: URGENT CARE | Facility: URGENT CARE | Age: 40
End: 2025-03-25

## 2025-03-25 VITALS
OXYGEN SATURATION: 100 % | TEMPERATURE: 99.9 F | SYSTOLIC BLOOD PRESSURE: 106 MMHG | WEIGHT: 159.8 LBS | DIASTOLIC BLOOD PRESSURE: 70 MMHG | RESPIRATION RATE: 20 BRPM | HEART RATE: 91 BPM

## 2025-03-25 DIAGNOSIS — K21.00 GASTROESOPHAGEAL REFLUX DISEASE WITH ESOPHAGITIS WITHOUT HEMORRHAGE: ICD-10-CM

## 2025-03-25 DIAGNOSIS — R05.1 ACUTE COUGH: ICD-10-CM

## 2025-03-25 DIAGNOSIS — Z20.828 EXPOSURE TO INFLUENZA: Primary | ICD-10-CM

## 2025-03-25 DIAGNOSIS — N92.6 LATE MENSES: ICD-10-CM

## 2025-03-25 LAB
ALBUMIN UR-MCNC: NEGATIVE MG/DL
APPEARANCE UR: CLEAR
BACTERIA #/AREA URNS HPF: ABNORMAL /HPF
BILIRUB UR QL STRIP: NEGATIVE
COLOR UR AUTO: YELLOW
DEPRECATED S PYO AG THROAT QL EIA: NEGATIVE
FLUAV AG SPEC QL IA: NEGATIVE
FLUBV AG SPEC QL IA: NEGATIVE
GLUCOSE UR STRIP-MCNC: NEGATIVE MG/DL
HCG UR QL: NEGATIVE
HGB UR QL STRIP: ABNORMAL
KETONES UR STRIP-MCNC: NEGATIVE MG/DL
LEUKOCYTE ESTERASE UR QL STRIP: NEGATIVE
NITRATE UR QL: NEGATIVE
PH UR STRIP: 6 [PH] (ref 5–7)
RBC #/AREA URNS AUTO: ABNORMAL /HPF
S PYO DNA THROAT QL NAA+PROBE: NOT DETECTED
SP GR UR STRIP: >=1.03 (ref 1–1.03)
SQUAMOUS #/AREA URNS AUTO: ABNORMAL /LPF
UROBILINOGEN UR STRIP-ACNC: 0.2 E.U./DL
WBC #/AREA URNS AUTO: ABNORMAL /HPF

## 2025-03-25 PROCEDURE — T1013 SIGN LANG/ORAL INTERPRETER: HCPCS | Mod: U4

## 2025-03-25 PROCEDURE — 81025 URINE PREGNANCY TEST: CPT | Performed by: PHYSICIAN ASSISTANT

## 2025-03-25 PROCEDURE — 87804 INFLUENZA ASSAY W/OPTIC: CPT | Performed by: PHYSICIAN ASSISTANT

## 2025-03-25 PROCEDURE — 87651 STREP A DNA AMP PROBE: CPT | Performed by: PHYSICIAN ASSISTANT

## 2025-03-25 PROCEDURE — 99215 OFFICE O/P EST HI 40 MIN: CPT | Performed by: PHYSICIAN ASSISTANT

## 2025-03-25 PROCEDURE — 81001 URINALYSIS AUTO W/SCOPE: CPT | Performed by: PHYSICIAN ASSISTANT

## 2025-03-25 RX ORDER — ACETAMINOPHEN 500 MG
500-1000 TABLET ORAL EVERY 6 HOURS PRN
Qty: 40 TABLET | Refills: 0 | Status: SHIPPED | OUTPATIENT
Start: 2025-03-25

## 2025-03-25 RX ORDER — FAMOTIDINE 20 MG/1
20 TABLET, FILM COATED ORAL 2 TIMES DAILY
Qty: 60 TABLET | Refills: 0 | Status: SHIPPED | OUTPATIENT
Start: 2025-03-25

## 2025-03-25 RX ORDER — OSELTAMIVIR PHOSPHATE 75 MG/1
75 CAPSULE ORAL 2 TIMES DAILY
Qty: 10 CAPSULE | Refills: 0 | Status: SHIPPED | OUTPATIENT
Start: 2025-03-25 | End: 2025-03-30

## 2025-03-25 NOTE — PATIENT INSTRUCTIONS
(Z20.828) Exposure to influenza  (primary encounter diagnosis)  Comment:   Plan: oseltamivir (TAMIFLU) 75 MG capsule,         acetaminophen (TYLENOL) 500 MG tablet            (R05.1) Acute cough  Comment:   Plan: Influenza A & B Antigen - Clinic Collect,         Streptococcus A Rapid Screen w/Reflex to PCR -         Clinic Collect, Group A Streptococcus PCR         Throat Swab            (N92.6) Late menses  Comment:   Plan: HCG qualitative urine          Rest    Follow up with GYN should menstrual cycle fail to occur within the next few weeks.    (K21.00) Gastroesophageal reflux disease with esophagitis without hemorrhage  Comment:   Plan: famotidine (PEPCID) 20 MG tablet        1 pill twice a day  Follow up with primary doctor.          
Detail Level: Zone
Render In Strict Bullet Format?: Yes
Plan: Follow up in 1 month via telemedicine visit.\\nDiscussed better outcomes with higher cumulative dose and so it was agreed to continue for at least one additional month.  Will consider continuation on a monthly basis going forward.
Continue Regimen: Claravis 30 mg QD

## 2025-03-25 NOTE — PROGRESS NOTES
Patient presents with:  Urgent Care  Cough: - sx: bodyaches, cough, and chills started last night  - tx otc: tylenol last night but did not help    (Z20.828) Exposure to influenza  (primary encounter diagnosis)  Comment:   Plan: oseltamivir (TAMIFLU) 75 MG capsule,         acetaminophen (TYLENOL) 500 MG tablet            (R05.1) Acute cough  Comment:   Plan: Influenza A & B Antigen - Clinic Collect,         Streptococcus A Rapid Screen w/Reflex to PCR -         Clinic Collect, Group A Streptococcus PCR         Throat Swab            (N92.6) Late menses  Comment: Small amount of blood in urine today, could be menses starting.  Plan: HCG qualitative urine          Rest    Follow up with GYN should menstrual cycle fail to occur within the next few weeks.    (K21.00) Gastroesophageal reflux disease with esophagitis without hemorrhage  Comment:   Plan: famotidine (PEPCID) 20 MG tablet        1 pill twice a day  Follow up with primary doctor.        At the end of the encounter, I discussed results, diagnosis, medications. Discussed red flags for immediate return to clinic/ER, as well as indications for follow up if no improvement. Patient understood and agreed to plan. Patient was stable for discharge     41 minutes spent by me on the date of the encounter doing chart review, review of test results, interpretation of tests, patient visit, documentation, and communication via [employer] telephone         SUBJECTIVE:   Jessenia Dueñas is a 40 year old female who presents today with body aches, cough and chills since last night.    [employer] telephone  used to communicate with patient today.     Menses is 6 days late.  Some low back pain.  Denies any dysuria or urinary frequency.  Denies any vaginal discharge.  Denies any lower abdominal pain.      Does complain of heartburn symptoms, as she has had in the past.  Would like a refill of her heartburn medication.    Patient Active Problem List   Diagnosis      (spontaneous vaginal delivery)         No past medical history on file.      Current Outpatient Medications   Medication Sig Dispense Refill    Multiple Vitamins-Iron (DAILY-PRATEEK/IRON/BETA-CAROTENE) TABS TAKE 1 TABLET BY MOUTH DAILY. (Patient not taking: Reported on 10/19/2020) 30 tablet 7     Social History     Tobacco Use    Smoking status: Never Smoker    Smokeless tobacco: Never Used   Substance Use Topics    Alcohol use: Not on file     Family History   Problem Relation Age of Onset    Diabetes Mother     Diabetes Father          ROS:    10 point ROS of systems including Constitutional, Eyes, Respiratory, Cardiovascular, Gastroenterology, Genitourinary, Integumentary, Muscularskeletal, Psychiatric ,neurological were all negative except for pertinent positives noted in my HPI       OBJECTIVE:  /70   Pulse 91   Temp 99.9  F (37.7  C) (Tympanic)   Resp 20   Wt 72.5 kg (159 lb 12.8 oz)   LMP 02/17/2025   SpO2 100%   Breastfeeding Yes   Physical Exam:  GENERAL APPEARANCE: healthy, alert and no distress  EYES: EOMI,  PERRL, conjunctiva clear  HENT: ear canals and TM's normal.  Nose and mouth without ulcers, erythema or lesions  NECK: supple, nontender, no lymphadenopathy  RESP: lungs clear to auscultation - no rales, rhonchi or wheezes  CV: regular rates and rhythm, normal S1 S2, no murmur noted  ABDOMEN:  soft, nontender, no HSM or masses and bowel sounds normal  NEURO: Normal strength and tone, sensory exam grossly normal,  normal speech and mentation  SKIN: no suspicious lesions or rashes    Results for orders placed or performed in visit on 03/25/25   HCG qualitative urine     Status: Normal   Result Value Ref Range    hCG Urine Qualitative Negative Negative   UA Macroscopic with reflex to Microscopic and Culture - Clinic Collect     Status: Abnormal    Specimen: Urine, Clean Catch   Result Value Ref Range    Color Urine Yellow Colorless, Straw, Light Yellow, Yellow    Appearance Urine Clear Clear     Glucose Urine Negative Negative mg/dL    Bilirubin Urine Negative Negative    Ketones Urine Negative Negative mg/dL    Specific Gravity Urine >=1.030 1.003 - 1.035    Blood Urine Moderate (A) Negative    pH Urine 6.0 5.0 - 7.0    Protein Albumin Urine Negative Negative mg/dL    Urobilinogen Urine 0.2 0.2, 1.0 E.U./dL    Nitrite Urine Negative Negative    Leukocyte Esterase Urine Negative Negative   UA Microscopic with Reflex to Culture     Status: Abnormal   Result Value Ref Range    Bacteria Urine None Seen None Seen /HPF    RBC Urine 10-25 (A) 0-2 /HPF /HPF    WBC Urine 0-5 0-5 /HPF /HPF    Squamous Epithelials Urine Few (A) None Seen /LPF    Narrative    Urine Culture not indicated   Influenza A & B Antigen - Clinic Collect     Status: Normal    Specimen: Nose; Swab   Result Value Ref Range    Influenza A antigen Negative Negative    Influenza B antigen Negative Negative    Narrative    Test results must be correlated with clinical data. If necessary, results should be confirmed by a molecular assay or viral culture.   Streptococcus A Rapid Screen w/Reflex to PCR - Clinic Collect     Status: Normal    Specimen: Throat; Swab   Result Value Ref Range    Group A Strep antigen Negative Negative

## 2025-05-10 ENCOUNTER — OFFICE VISIT (OUTPATIENT)
Dept: URGENT CARE | Facility: URGENT CARE | Age: 40
End: 2025-05-10
Payer: COMMERCIAL

## 2025-05-10 VITALS
TEMPERATURE: 97.5 F | OXYGEN SATURATION: 95 % | BODY MASS INDEX: 24.59 KG/M2 | RESPIRATION RATE: 20 BRPM | DIASTOLIC BLOOD PRESSURE: 70 MMHG | HEART RATE: 88 BPM | HEIGHT: 66 IN | WEIGHT: 153 LBS | SYSTOLIC BLOOD PRESSURE: 120 MMHG

## 2025-05-10 DIAGNOSIS — Z33.1 PREGNANCY, INCIDENTAL: ICD-10-CM

## 2025-05-10 DIAGNOSIS — R11.0 NAUSEA: Primary | ICD-10-CM

## 2025-05-10 PROCEDURE — 3078F DIAST BP <80 MM HG: CPT | Performed by: NURSE PRACTITIONER

## 2025-05-10 PROCEDURE — 99213 OFFICE O/P EST LOW 20 MIN: CPT | Performed by: NURSE PRACTITIONER

## 2025-05-10 PROCEDURE — 3074F SYST BP LT 130 MM HG: CPT | Performed by: NURSE PRACTITIONER

## 2025-05-10 RX ORDER — PYRIDOXINE HCL (VITAMIN B6) 25 MG
25 TABLET ORAL DAILY
Qty: 60 TABLET | Refills: 0 | Status: SHIPPED | OUTPATIENT
Start: 2025-05-10 | End: 2025-05-11

## 2025-05-10 NOTE — PROGRESS NOTES
"Chief Complaint   Patient presents with    Medication Request         ICD-10-CM    1. Nausea  R11.0 pyridOXINE (VITAMIN B6) 25 MG tablet      2. Pregnancy, incidental  Z33.1       Give patient written Rx for vitamin B6.  She will take this to her pharmacy to fill.  Follow-up with obstetrics.    Subjective     Jessenia Dueñas is an 40 year old female who presents to clinic today for nausea due to pregnancy.  She was seen yesterday and given prescription for paroxetine but was unable to get this filled at the pharmacy.  They said they never received an order.  She denies any fever, chills, vomiting, diarrhea, dysuria.      Objective    /70   Pulse 88   Temp 97.5  F (36.4  C) (Oral)   Resp 20   Ht 1.676 m (5' 6\")   Wt 69.4 kg (153 lb)   LMP 02/17/2025   SpO2 95%   BMI 24.69 kg/m    Nurses notes and VS have been reviewed.    Physical Exam       GENERAL APPEARANCE: healthy appearing, alert     ABDOMEN:  soft, nontender, no HSM or masses and bowel sounds normal, no CVA tenderness     MS: extremities normal- no gross deformities noted; normal muscle tone.     SKIN: no suspicious lesions or rashes      JUAN Mckee, CNP  [facility] Provider    The use of Dragon/[employer] dictation services may have been used to construct the content in this note; any grammatical or spelling errors are non-intentional. Please contact the author of this note directly if you are in need of any clarification.   "

## 2025-05-10 NOTE — PROGRESS NOTES
Urgent Care Clinic Visit    Chief Complaint   Patient presents with    Medication Request               5/10/2025     6:24 PM   Additional Questions   Roomed by parmjit brown   Accompanied by daughter

## 2025-05-11 ENCOUNTER — OFFICE VISIT (OUTPATIENT)
Dept: URGENT CARE | Facility: URGENT CARE | Age: 40
End: 2025-05-11
Payer: COMMERCIAL

## 2025-05-11 VITALS
BODY MASS INDEX: 24.69 KG/M2 | WEIGHT: 153 LBS | HEART RATE: 109 BPM | RESPIRATION RATE: 24 BRPM | SYSTOLIC BLOOD PRESSURE: 112 MMHG | OXYGEN SATURATION: 100 % | DIASTOLIC BLOOD PRESSURE: 74 MMHG

## 2025-05-11 DIAGNOSIS — K21.00 GASTROESOPHAGEAL REFLUX DISEASE WITH ESOPHAGITIS WITHOUT HEMORRHAGE: ICD-10-CM

## 2025-05-11 DIAGNOSIS — R11.2 NAUSEA AND VOMITING, UNSPECIFIED VOMITING TYPE: Primary | ICD-10-CM

## 2025-05-11 PROCEDURE — 3074F SYST BP LT 130 MM HG: CPT | Performed by: FAMILY MEDICINE

## 2025-05-11 PROCEDURE — 99213 OFFICE O/P EST LOW 20 MIN: CPT | Performed by: FAMILY MEDICINE

## 2025-05-11 PROCEDURE — 3078F DIAST BP <80 MM HG: CPT | Performed by: FAMILY MEDICINE

## 2025-05-11 RX ORDER — DOXYLAMINE SUCCINATE AND PYRIDOXINE HYDROCHLORIDE, DELAYED RELEASE TABLETS 10 MG/10 MG 10; 10 MG/1; MG/1
1 TABLET, DELAYED RELEASE ORAL 2 TIMES DAILY
Qty: 28 TABLET | Refills: 0 | Status: SHIPPED | OUTPATIENT
Start: 2025-05-11 | End: 2025-05-11

## 2025-05-11 RX ORDER — DOXYLAMINE SUCCINATE AND PYRIDOXINE HYDROCHLORIDE, DELAYED RELEASE TABLETS 10 MG/10 MG 10; 10 MG/1; MG/1
1 TABLET, DELAYED RELEASE ORAL 2 TIMES DAILY
Qty: 28 TABLET | Refills: 2 | Status: SHIPPED | OUTPATIENT
Start: 2025-05-11

## 2025-05-11 RX ORDER — FAMOTIDINE 20 MG/1
20 TABLET, FILM COATED ORAL 2 TIMES DAILY
Qty: 60 TABLET | Refills: 0 | Status: SHIPPED | OUTPATIENT
Start: 2025-05-11

## 2025-05-11 ASSESSMENT — ENCOUNTER SYMPTOMS
NAUSEA: 1
FEVER: 0

## 2025-05-11 NOTE — PROGRESS NOTES
Urgent Care Clinic Visit    Chief Complaint   Patient presents with    Nausea     Patient is PREGNANT: Concern re: 2 days ago office visit and Vit B given for nausea? Also needs refill on Pepcid please.               5/11/2025    12:03 PM   Additional Questions   Roomed by Selam   Accompanied by self

## 2025-05-11 NOTE — PROGRESS NOTES
Assessment & Plan     Nausea and vomiting, unspecified vomiting type  Currently pregnant, approximately 3 weeks by LMP, start Diclegis.  Follow-up with OB for ongoing management.  Currently without severe symptoms of dehydration warranting IV fluids at this time.  - Doxylamine-Pyridoxine 10-10 MG TBEC  Dispense: 28 tablet; Refill: 2    Gastroesophageal reflux disease with esophagitis without hemorrhage  Result.  - famotidine (PEPCID) 20 MG tablet  Dispense: 60 tablet; Refill: 0             Return in about 1 week (around 5/18/2025) for If symptoms do not improve or gets worse..    Les Cox MD  [facility] DEEJAY Ruelas is a 40 year old female who presents to clinic today for the following health issues:  Chief Complaint   Patient presents with    Nausea     Patient is PREGNANT: Concern re: 2 days ago office visit and Vit B given for nausea? Also needs refill on Pepcid please.         5/11/2025    12:03 PM   Additional Questions   Roomed by Selam   Accompanied by self     Nausea  Associated symptoms include nausea. Pertinent negatives include no fever.       Patient is a 40-year-old female seen 2 days ago and yesterday in urgent care for nausea vomiting found to be pregnant.      Despite taking the vitamin B6, because she continues to still be nauseous.    Also asking for refill of her heartburn medications.    No vaginal bleeding or abdominal pain.  Still able to void.      Review of Systems   Constitutional:  Negative for fever.   Gastrointestinal:  Positive for nausea.           Objective    /74   Pulse 109   Resp 24   Wt 69.4 kg (153 lb)   LMP 02/17/2025   SpO2 100%   BMI 24.69 kg/m    Physical Exam  Vitals reviewed.   Constitutional:       Appearance: She is not ill-appearing.   HENT:      Mouth/Throat:      Mouth: Mucous membranes are moist.   Cardiovascular:      Rate and Rhythm: Normal rate and regular rhythm.   Pulmonary:      Effort: Pulmonary effort is  normal.      Breath sounds: Normal breath sounds.

## 2025-05-22 ENCOUNTER — OFFICE VISIT (OUTPATIENT)
Dept: INTERNAL MEDICINE | Facility: CLINIC | Age: 40
End: 2025-05-22
Payer: COMMERCIAL

## 2025-05-22 VITALS
DIASTOLIC BLOOD PRESSURE: 69 MMHG | HEART RATE: 87 BPM | OXYGEN SATURATION: 100 % | HEIGHT: 66 IN | TEMPERATURE: 98.9 F | BODY MASS INDEX: 25.26 KG/M2 | WEIGHT: 157.2 LBS | SYSTOLIC BLOOD PRESSURE: 109 MMHG

## 2025-05-22 DIAGNOSIS — Z32.01 PREGNANCY TEST POSITIVE: ICD-10-CM

## 2025-05-22 DIAGNOSIS — K21.9 GASTROESOPHAGEAL REFLUX DISEASE WITHOUT ESOPHAGITIS: ICD-10-CM

## 2025-05-22 DIAGNOSIS — R39.15 URINARY URGENCY: Primary | ICD-10-CM

## 2025-05-22 NOTE — PATIENT INSTRUCTIONS
"Patient Education   Agnieszka Turner. Coltdeep Jessica.  Johnny Exercises: Care Instructions  Dimasaidangray Padilla.  Agnieszka Turner wuxuu xoojiyaa muruqyada u dhow kaadi haysta. Murqahaan waxay xukumaan socoshada kaadida. Alyseiyamilka Turner keaton waxaa loogu yeeraa jimicsiyada \"pelvic floor\" (miskaha dhulka). Waxay kaalmo uyihiin ka hortaga kaadi fakata unugyada miskahana meelohooday ku khayaan.  Naag hada cunug dhashay waxay u baahan kartaa inay isku daydo alyseiyamilka Turner. Waxay xoojin karaan murqaha miskaha oo ay shanta dabciyeen uurkii iyo dhalmada. Tamiko iyo naaba way isticmaali amyaan alyseiyamilka Turner.  Waxaad u samay n kartaa jimicsiyada Johnny adigoo adkaynaya murqaha aad kaadiga u isticmaashid. Waxaad u baahantahay inaa jimicsiyadaan samayso usbuucyo badan si aad u fiicnaato.  Lasocoshada-Daryeelka waa qayb muhiim ah oo ka mid ah daaweyntaada iyo badbaadadaada. Hubso in aad sameysato oo aad tagto balamaha dhakhtarka oo jimmy, waxaadna wacdaa dhakhtarkaaga haddaad dhib qabto. Sidoo kale waa fikrad fiican inaad ogaato natiijayada baaritaannada lagugu sameeyay, iyo inaad kaydsato liiska daawooyinka aad qaadato.  Sideed shakhsi ahaan isugu daryeeli kartaa gurigaaga.  Samee agnieszka Turner.  Hel jimicsiyada aad u baahantahay inaad xoojiso. Si aad u samaysid, adkee murqaha joojiya kaadida markaad musqusha u socotid. Waa bernardo murqaha aad cadaadisid intuu socdo brissaa Johnny.  Sida ugu xooga badan u cadaadi murqaha. Caloosha iyo bowdyuhu inay ku dhaqaaqaan maahan.  Cadaadiska ku hay 3 seken. Kadibna jacquie mudo 3 seken ah.  Ku bilow 3 seken, kadibna ku elly 1 second usbuuciiba ilaa aad hayn kartid ilaa 10 seken.  Ku alison jimicsigaan 10 ilaa 15 mar markiiba. Samee sadex jeer ama ka badan maalintii.  Waa iska fiirin kartaa inaad isticmaalaysid murqaha saxda ah. Farta siilka iska adal kadibna riix murqaha. Si sax ah ayaad u samaynaysaa markaad dareentid culeyska fartaada ku krystaleejuan pablo. Merissakhtaanamariaagu wuxuu kugula " "pia kataa inaad culeys gooni ah gelisid siilkaaga inta jimicsigu socdo.  La cabbin sigaar. Connoradihatyronea rosa ha ku maria eugenia penaa. Haddii aad u baahantahay kaalmo joojinta ah, mireya hadal dhakhtarkaaga wixii ku mellybsan barnaamijyada iyo daawooyinka lagu joojiyo-sigaarka. Kuwan waxay kordhinayaan fursadahaaga iska-daynta rasmiga ah.  Halkeed ku jarrod kartaa wax badan?  Tag   [email or web address]  Cinda Q681 Gudaha sanduuqa raadinta si loo jesse wax badan oo judy gato. \"Agnieszka Turner. Tilmaamaha Daryeelka..\"  Hadda laga bilaabo: Bisha Afraad 30, 2024  Nooca Tusmadda: 14.4    [id number] [facility]   Tilmaamaha daryeelka la qaatay hoosta ruqsada xirfad yaqaankaaga daryeelka caafimaadka Haddii aad qabto castaneda aalo ku alexiaallan xaalada caafimaadka ama tilmaantan, waydii had iyo kemar xirfad yaqaankaaga caafimaadka. [facility] waxay diiday damaacarlin alasta ama masuuliyada isticmaalka nabeel.       "

## 2025-05-22 NOTE — PROGRESS NOTES
"  Assessment & Plan     Urinary urgency  Referral placed for physical therapy  Patient wanted to try medications, however as she is pregnant cannot currently try    Pregnancy test positive  Follow-up with OB    Gastroesophageal reflux disease without esophagitis  Stable, continue medication Pepcid            BMI  Estimated body mass index is 25.37 kg/m  as calculated from the following:    Height as of this encounter: 1.676 m (5' 6\").    Weight as of this encounter: 71.3 kg (157 lb 3.2 oz).               Alexis Ruelas is a 40 year old, presenting for the following health issues:  Bladder Problems    History of Present Illness       Reason for visit:  Bladder Problems    She eats 2-3 servings of fruits and vegetables daily.She consumes 1 sweetened beverage(s) daily.She exercises with enough effort to increase her heart rate 9 or less minutes per day.  She exercises with enough effort to increase her heart rate 3 or less days per week.   She is taking medications regularly.          Video  used.        Patient has been having bladder problems for some time, mainly urge incontinence.    Symptoms started in 2021 and have been worsening.    She is currently pregnant, has had 4 kids already.  Has not been to pelvic therapy or tried pelvic floor exercises yet.  Denies any urinary burning,  Had recent normal UA.    Also complaining of chronic mild headaches.    Has GERD and symptoms are controlled with Pepcid.         Current Outpatient Medications   Medication Sig Dispense Refill    acetaminophen (TYLENOL) 500 MG tablet Take 1-2 tablets (500-1,000 mg) by mouth every 6 hours as needed for mild pain. 40 tablet 0    Doxylamine-Pyridoxine 10-10 MG TBEC Take 1 Dose by mouth 2 times daily. 28 tablet 2    famotidine (PEPCID) 20 MG tablet Take 1 tablet (20 mg) by mouth 2 times daily. 60 tablet 0           Review of Systems  Constitutional, HEENT, cardiovascular, pulmonary, gi and gu systems are negative, except as " "otherwise noted.      Objective    /69   Pulse 87   Temp 98.9  F (37.2  C) (Temporal)   Ht 1.676 m (5' 6\")   Wt 71.3 kg (157 lb 3.2 oz)   LMP 02/17/2025   SpO2 100%   BMI 25.37 kg/m    Body mass index is 25.37 kg/m .  Physical Exam   GENERAL: alert and no distress  NECK: no adenopathy, no asymmetry, masses, or scars  RESP: lungs clear to auscultation - no rales, rhonchi or wheezes  CV: regular rate and rhythm, normal S1 S2, no S3 or S4, no murmur, click or rub, no peripheral edema  ABDOMEN: soft, nontender, no hepatosplenomegaly, no masses and bowel sounds normal  MS: no gross musculoskeletal defects noted, no edema            Signed Electronically by: Jodie Subramanian MD    "

## 2025-05-26 ENCOUNTER — PATIENT OUTREACH (OUTPATIENT)
Dept: CARE COORDINATION | Facility: CLINIC | Age: 40
End: 2025-05-26
Payer: COMMERCIAL

## 2025-06-09 ENCOUNTER — VIRTUAL VISIT (OUTPATIENT)
Dept: OBGYN | Facility: CLINIC | Age: 40
End: 2025-06-09
Payer: COMMERCIAL

## 2025-06-09 DIAGNOSIS — Z34.90 SUPERVISION OF NORMAL PREGNANCY: Primary | ICD-10-CM

## 2025-06-09 PROCEDURE — 99207 PR NO CHARGE NURSE ONLY: CPT | Mod: 93

## 2025-06-09 RX ORDER — VITAMIN A, VITAMIN C, VITAMIN D-3, VITAMIN E, VITAMIN B-1, VITAMIN B-2, NIACIN, VITAMIN B-6, CALCIUM, IRON, ZINC, COPPER 4000; 120; 400; 22; 1.84; 3; 20; 10; 1; 12; 200; 27; 25; 2 [IU]/1; MG/1; [IU]/1; MG/1; MG/1; MG/1; MG/1; MG/1; MG/1; UG/1; MG/1; MG/1; MG/1; MG/1
1 TABLET ORAL DAILY
COMMUNITY

## 2025-06-09 NOTE — PROGRESS NOTES
NPN nurse visit done over the phone. Pt will be given NPN folder and book at her upcoming appt.  Discussed optional screening available to assess chromosomal anomalies. Questions answered. Informed pt of the clinic structure (on-call does deliveries for the day, may be male or female doctor). Pt advised to call the clinic if she has any questions or concerns related to her pregnancy. Prenatal labs will be obtained at her upcoming appt. New prenatal visit scheduled on 25 with Dr Shaw.      10w3d    *Advised we have both male and female providers and cannot guarantee gender preference for appt or delivery    Menstrual cycles: regular  Date of positive pregnancy test: unkown  Medications stopped upon pos HPT: none    Last pap: unknown        Patient supplied answers from flow sheet for:  Prenatal OB Questionnaire.  Past Medical History  Have you ever recieved care for your mental health? : No  Have you ever been in a major accident or suffered serious trauma?: No  Within the last year, has anyone hit, slapped, kicked or otherwise hurt you?: No  In the last year, has anyone forced you to have sex when you didn't want to?: No    Past Medical History 2   Have you ever received a blood transfusion?: No  Would you accept a blood transfusion if was medically recommended?: Yes  Does anyone in your home smoke?: No   Is your blood type Rh negative?: No  Have you ever ?: (!) Yes  Have you been hospitalized for a nonsurgical reason excluding normal delivery?: No  Have you ever had an abnormal pap smear?: No    Past Medical History (Continued)  Do you have a history of abnormalities of the uterus?: No  Did your mother take RAMIRO or any other hormones when she was pregnant with you?: Unknown  Do you have any other problems we have not asked about which you feel may be important to this pregnancy?: No                   JULISSA OrozcoCASPRE

## 2025-06-11 ENCOUNTER — HOSPITAL ENCOUNTER (EMERGENCY)
Facility: CLINIC | Age: 40
Discharge: HOME | End: 2025-06-11
Attending: EMERGENCY MEDICINE | Admitting: EMERGENCY MEDICINE
Payer: COMMERCIAL

## 2025-06-11 VITALS
SYSTOLIC BLOOD PRESSURE: 107 MMHG | BODY MASS INDEX: 25.41 KG/M2 | WEIGHT: 157.41 LBS | DIASTOLIC BLOOD PRESSURE: 50 MMHG | TEMPERATURE: 98.8 F | HEART RATE: 83 BPM | OXYGEN SATURATION: 100 % | RESPIRATION RATE: 16 BRPM

## 2025-06-11 DIAGNOSIS — O21.9 NAUSEA AND VOMITING DURING PREGNANCY: ICD-10-CM

## 2025-06-11 DIAGNOSIS — Z3A.11 11 WEEKS GESTATION OF PREGNANCY: ICD-10-CM

## 2025-06-11 LAB
ALBUMIN SERPL BCG-MCNC: 4.5 G/DL (ref 3.5–5.2)
ALP SERPL-CCNC: 33 U/L (ref 40–150)
ALT SERPL W P-5'-P-CCNC: 17 U/L (ref 0–50)
ANION GAP SERPL CALCULATED.3IONS-SCNC: 9 MMOL/L (ref 7–15)
AST SERPL W P-5'-P-CCNC: 18 U/L (ref 0–45)
BASOPHILS # BLD AUTO: 0 10E3/UL (ref 0–0.2)
BASOPHILS NFR BLD AUTO: 1 %
BILIRUB SERPL-MCNC: 0.3 MG/DL
BUN SERPL-MCNC: 12.6 MG/DL (ref 6–20)
CALCIUM SERPL-MCNC: 9.5 MG/DL (ref 8.8–10.4)
CHLORIDE SERPL-SCNC: 103 MMOL/L (ref 98–107)
CREAT SERPL-MCNC: 0.47 MG/DL (ref 0.51–0.95)
EGFRCR SERPLBLD CKD-EPI 2021: >90 ML/MIN/1.73M2
EOSINOPHIL # BLD AUTO: 0.1 10E3/UL (ref 0–0.7)
EOSINOPHIL NFR BLD AUTO: 2 %
ERYTHROCYTE [DISTWIDTH] IN BLOOD BY AUTOMATED COUNT: 14.6 % (ref 10–15)
GLUCOSE SERPL-MCNC: 100 MG/DL (ref 70–99)
HCO3 SERPL-SCNC: 23 MMOL/L (ref 22–29)
HCT VFR BLD AUTO: 26.9 % (ref 35–47)
HGB BLD-MCNC: 8.9 G/DL (ref 11.7–15.7)
IMM GRANULOCYTES # BLD: 0 10E3/UL
IMM GRANULOCYTES NFR BLD: 0 %
LYMPHOCYTES # BLD AUTO: 1.8 10E3/UL (ref 0.8–5.3)
LYMPHOCYTES NFR BLD AUTO: 29 %
MCH RBC QN AUTO: 24.9 PG (ref 26.5–33)
MCHC RBC AUTO-ENTMCNC: 33.1 G/DL (ref 31.5–36.5)
MCV RBC AUTO: 75 FL (ref 78–100)
MONOCYTES # BLD AUTO: 0.6 10E3/UL (ref 0–1.3)
MONOCYTES NFR BLD AUTO: 9 %
NEUTROPHILS # BLD AUTO: 3.8 10E3/UL (ref 1.6–8.3)
NEUTROPHILS NFR BLD AUTO: 60 %
NRBC # BLD AUTO: 0 10E3/UL
NRBC BLD AUTO-RTO: 0 /100
PLATELET # BLD AUTO: 282 10E3/UL (ref 150–450)
POTASSIUM SERPL-SCNC: 3.8 MMOL/L (ref 3.4–5.3)
PROT SERPL-MCNC: 7.1 G/DL (ref 6.4–8.3)
RBC # BLD AUTO: 3.58 10E6/UL (ref 3.8–5.2)
SODIUM SERPL-SCNC: 135 MMOL/L (ref 135–145)
WBC # BLD AUTO: 6.4 10E3/UL (ref 4–11)

## 2025-06-11 PROCEDURE — 258N000003 HC RX IP 258 OP 636: Performed by: EMERGENCY MEDICINE

## 2025-06-11 PROCEDURE — 96361 HYDRATE IV INFUSION ADD-ON: CPT | Performed by: EMERGENCY MEDICINE

## 2025-06-11 PROCEDURE — 96374 THER/PROPH/DIAG INJ IV PUSH: CPT | Performed by: EMERGENCY MEDICINE

## 2025-06-11 PROCEDURE — 84295 ASSAY OF SERUM SODIUM: CPT | Performed by: EMERGENCY MEDICINE

## 2025-06-11 PROCEDURE — 250N000011 HC RX IP 250 OP 636: Performed by: EMERGENCY MEDICINE

## 2025-06-11 PROCEDURE — 36415 COLL VENOUS BLD VENIPUNCTURE: CPT | Performed by: EMERGENCY MEDICINE

## 2025-06-11 PROCEDURE — 99284 EMERGENCY DEPT VISIT MOD MDM: CPT | Mod: 25 | Performed by: EMERGENCY MEDICINE

## 2025-06-11 PROCEDURE — 85025 COMPLETE CBC W/AUTO DIFF WBC: CPT | Performed by: EMERGENCY MEDICINE

## 2025-06-11 RX ORDER — ONDANSETRON 2 MG/ML
4 INJECTION INTRAMUSCULAR; INTRAVENOUS ONCE
Status: COMPLETED | OUTPATIENT
Start: 2025-06-11 | End: 2025-06-11

## 2025-06-11 RX ORDER — ONDANSETRON 4 MG/1
4 TABLET, ORALLY DISINTEGRATING ORAL EVERY 6 HOURS PRN
Qty: 20 TABLET | Refills: 0 | Status: SHIPPED | OUTPATIENT
Start: 2025-06-11

## 2025-06-11 RX ADMIN — SODIUM CHLORIDE 1000 ML: 9 INJECTION, SOLUTION INTRAVENOUS at 17:50

## 2025-06-11 RX ADMIN — ONDANSETRON 4 MG: 2 INJECTION, SOLUTION INTRAMUSCULAR; INTRAVENOUS at 17:50

## 2025-06-11 ASSESSMENT — COLUMBIA-SUICIDE SEVERITY RATING SCALE - C-SSRS
2. HAVE YOU ACTUALLY HAD ANY THOUGHTS OF KILLING YOURSELF IN THE PAST MONTH?: NO
6. HAVE YOU EVER DONE ANYTHING, STARTED TO DO ANYTHING, OR PREPARED TO DO ANYTHING TO END YOUR LIFE?: NO
1. IN THE PAST MONTH, HAVE YOU WISHED YOU WERE DEAD OR WISHED YOU COULD GO TO SLEEP AND NOT WAKE UP?: NO

## 2025-06-11 ASSESSMENT — ACTIVITIES OF DAILY LIVING (ADL)
ADLS_ACUITY_SCORE: 46
ADLS_ACUITY_SCORE: 46

## 2025-06-11 NOTE — ED PROVIDER NOTES
Emergency Department Note      History of Present Illness     Chief Complaint   Vomiting      HPI A [employer] interpretor was used.   Jessenia Dueñas is a 40 year old female presenting to the ED for emesis. She is currently 11 weeks pregnant and reports nausea and emesis for the past week, stating that she has around 1-3x episodes daily. Today, she now endorses fatigue, weakness, and lightheadedness which is why she came in. She is currently taking vitamins and is on medication for the symptoms but does not remember what they are called. Denies abdominal pain, vaginal bleeding, or spotting.     Independent Historian   None    Review of External Notes   Prenatal intake visit 6/9/2025 with a new prenatal visit scheduled 6/30/2025.  Call if she has any other questions.  In May she was prescribed a combination doxylamine pyridoxine pill, acetaminophen, and famotidine.    Past Medical History     Medical History and Problem List   GERD without esophagitis  Varicella     Medications   Doxylamine-pyridoxine  Pepcid  Prenatal vitamins    Surgical History   No surgical history on file    Physical Exam     Patient Vitals for the past 24 hrs:   BP Temp Pulse Resp SpO2 Weight   06/11/25 1642 122/65 -- -- -- -- --   06/11/25 1641 -- 98.8  F (37.1  C) 83 16 99 % 71.4 kg (157 lb 6.5 oz)     Physical Exam    Eyes:               Sclera white; Pupils are equal and round  ENT:                External ears and nares normal  Resp:               Non-labored, no retractions or accessory muscle use  GI:                   Abdomen is soft, non-tender, non-distended                          No rebound tenderness or peritoneal features  MS:                  Moves all extremities  Skin:                Warm and dry  Neuro:             Speech is normal and fluent. No apparent deficit.    Diagnostics     Lab Results   Labs Ordered and Resulted from Time of ED Arrival to Time of ED Departure   COMPREHENSIVE METABOLIC PANEL - Abnormal       Result Value     Sodium 135      Potassium 3.8      Carbon Dioxide (CO2) 23      Anion Gap 9      Urea Nitrogen 12.6      Creatinine 0.47 (*)     GFR Estimate >90      Calcium 9.5      Chloride 103      Glucose 100 (*)     Alkaline Phosphatase 33 (*)     AST 18      ALT 17      Protein Total 7.1      Albumin 4.5      Bilirubin Total 0.3     CBC WITH PLATELETS AND DIFFERENTIAL - Abnormal    WBC Count 6.4      RBC Count 3.58 (*)     Hemoglobin 8.9 (*)     Hematocrit 26.9 (*)     MCV 75 (*)     MCH 24.9 (*)     MCHC 33.1      RDW 14.6      Platelet Count 282      % Neutrophils 60      % Lymphocytes 29      % Monocytes 9      % Eosinophils 2      % Basophils 1      % Immature Granulocytes 0      NRBCs per 100 WBC 0      Absolute Neutrophils 3.8      Absolute Lymphocytes 1.8      Absolute Monocytes 0.6      Absolute Eosinophils 0.1      Absolute Basophils 0.0      Absolute Immature Granulocytes 0.0      Absolute NRBCs 0.0         Imaging   No orders to display       EKG   None    Independent Interpretation   None    ED Course      Medications Administered   Medications   sodium chloride 0.9% BOLUS 1,000 mL (1,000 mLs Intravenous $New Bag 6/11/25 1750)   ondansetron (ZOFRAN) injection 4 mg (4 mg Intravenous $Given 6/11/25 1750)       Procedures   Procedures     Discussion of Management   None    ED Course   ED Course as of 06/11/25 1930 Wed Jun 11, 2025   1732 I obtained history and examined the patient as noted above.     1920 I rechecked the patient.        Additional Documentation  None    Medical Decision Making / Diagnosis     CMS Diagnoses: None    MIPS   None     MDM   No focal tenderness to suggest cholecystitis, appendicitis, diverticulitis.  Labs without evidence for hepatitis, biliary obstruction, severe electrolyte derangements or YULY.  Defer imaging unless developing refractory symptoms or focal pain.  Anemia is better than prior.  Discuss value at time of follow up with OB/Gyn.  Additional anti-emetics prescribed for  breakthrough symptoms.  Feeling improved.  Appropriate for discharge.     Disposition   The patient was discharged.     Diagnosis     ICD-10-CM    1. Nausea and vomiting during pregnancy  O21.9       2. 11 weeks gestation of pregnancy  Z3A.11            Discharge Medications   New Prescriptions    ONDANSETRON (ZOFRAN ODT) 4 MG ODT TAB    Take 1 tablet (4 mg) by mouth every 6 hours as needed for nausea or vomiting.         Scribe Disclosure:  I, Kwaku Valentin, am serving as a scribe at 5:39 PM on 6/11/2025 to document services personally performed by Judy Ye MD based on my observations and the provider's statements to me.        Judy Ye MD  06/11/25 2038

## 2025-06-15 LAB
ABO + RH BLD: NORMAL
BLD GP AB SCN SERPL QL: NEGATIVE
SPECIMEN EXP DATE BLD: NORMAL

## 2025-06-16 ENCOUNTER — ANCILLARY PROCEDURE (OUTPATIENT)
Dept: ULTRASOUND IMAGING | Facility: CLINIC | Age: 40
End: 2025-06-16
Attending: STUDENT IN AN ORGANIZED HEALTH CARE EDUCATION/TRAINING PROGRAM
Payer: COMMERCIAL

## 2025-06-16 ENCOUNTER — LAB (OUTPATIENT)
Dept: LAB | Facility: CLINIC | Age: 40
End: 2025-06-16
Attending: STUDENT IN AN ORGANIZED HEALTH CARE EDUCATION/TRAINING PROGRAM
Payer: COMMERCIAL

## 2025-06-16 DIAGNOSIS — Z34.90 SUPERVISION OF NORMAL PREGNANCY: ICD-10-CM

## 2025-06-16 DIAGNOSIS — K21.00 GASTROESOPHAGEAL REFLUX DISEASE WITH ESOPHAGITIS WITHOUT HEMORRHAGE: ICD-10-CM

## 2025-06-16 LAB
ALBUMIN UR-MCNC: NEGATIVE MG/DL
APPEARANCE UR: CLEAR
BACTERIA #/AREA URNS HPF: ABNORMAL /HPF
BILIRUB UR QL STRIP: NEGATIVE
COLOR UR AUTO: YELLOW
ERYTHROCYTE [DISTWIDTH] IN BLOOD BY AUTOMATED COUNT: 14.4 % (ref 10–15)
EST. AVERAGE GLUCOSE BLD GHB EST-MCNC: 108 MG/DL
GLUCOSE UR STRIP-MCNC: NEGATIVE MG/DL
HBA1C MFR BLD: 5.4 % (ref 0–5.6)
HBV SURFACE AG SERPL QL IA: NONREACTIVE
HCT VFR BLD AUTO: 25.8 % (ref 35–47)
HCV AB SERPL QL IA: NONREACTIVE
HGB BLD-MCNC: 8.8 G/DL (ref 11.7–15.7)
HGB UR QL STRIP: ABNORMAL
HIV 1+2 AB+HIV1 P24 AG SERPL QL IA: NONREACTIVE
KETONES UR STRIP-MCNC: NEGATIVE MG/DL
LEUKOCYTE ESTERASE UR QL STRIP: ABNORMAL
MCH RBC QN AUTO: 25.2 PG (ref 26.5–33)
MCHC RBC AUTO-ENTMCNC: 34.1 G/DL (ref 31.5–36.5)
MCV RBC AUTO: 74 FL (ref 78–100)
NITRATE UR QL: NEGATIVE
PH UR STRIP: 6 [PH] (ref 5–7)
PLATELET # BLD AUTO: 261 10E3/UL (ref 150–450)
RBC # BLD AUTO: 3.49 10E6/UL (ref 3.8–5.2)
RBC #/AREA URNS AUTO: ABNORMAL /HPF
RUBV IGG SERPL QL IA: 1.99 INDEX
RUBV IGG SERPL QL IA: POSITIVE
SP GR UR STRIP: 1.02 (ref 1–1.03)
SQUAMOUS #/AREA URNS AUTO: ABNORMAL /LPF
T PALLIDUM AB SER QL: NONREACTIVE
UROBILINOGEN UR STRIP-ACNC: 0.2 E.U./DL
WBC # BLD AUTO: 4.9 10E3/UL (ref 4–11)
WBC #/AREA URNS AUTO: ABNORMAL /HPF
YEAST #/AREA URNS HPF: ABNORMAL /HPF
YEAST #/AREA URNS HPF: ABNORMAL /HPF

## 2025-06-16 PROCEDURE — 3044F HG A1C LEVEL LT 7.0%: CPT

## 2025-06-16 PROCEDURE — 87340 HEPATITIS B SURFACE AG IA: CPT

## 2025-06-16 PROCEDURE — 85027 COMPLETE CBC AUTOMATED: CPT

## 2025-06-16 PROCEDURE — 87389 HIV-1 AG W/HIV-1&-2 AB AG IA: CPT

## 2025-06-16 PROCEDURE — 86762 RUBELLA ANTIBODY: CPT

## 2025-06-16 PROCEDURE — 86901 BLOOD TYPING SEROLOGIC RH(D): CPT

## 2025-06-16 PROCEDURE — 36415 COLL VENOUS BLD VENIPUNCTURE: CPT

## 2025-06-16 PROCEDURE — 86850 RBC ANTIBODY SCREEN: CPT

## 2025-06-16 PROCEDURE — 86900 BLOOD TYPING SEROLOGIC ABO: CPT

## 2025-06-16 PROCEDURE — 86803 HEPATITIS C AB TEST: CPT

## 2025-06-16 PROCEDURE — 76801 OB US < 14 WKS SINGLE FETUS: CPT | Performed by: OBSTETRICS & GYNECOLOGY

## 2025-06-16 PROCEDURE — 81001 URINALYSIS AUTO W/SCOPE: CPT

## 2025-06-16 PROCEDURE — 83036 HEMOGLOBIN GLYCOSYLATED A1C: CPT

## 2025-06-16 PROCEDURE — 86780 TREPONEMA PALLIDUM: CPT

## 2025-06-16 RX ORDER — FAMOTIDINE 20 MG/1
20 TABLET, FILM COATED ORAL 2 TIMES DAILY
Qty: 60 TABLET | Refills: 0 | Status: SHIPPED | OUTPATIENT
Start: 2025-06-16

## 2025-06-22 ENCOUNTER — OFFICE VISIT (OUTPATIENT)
Dept: URGENT CARE | Facility: URGENT CARE | Age: 40
End: 2025-06-22
Payer: COMMERCIAL

## 2025-06-22 VITALS
WEIGHT: 157 LBS | RESPIRATION RATE: 16 BRPM | SYSTOLIC BLOOD PRESSURE: 100 MMHG | OXYGEN SATURATION: 100 % | BODY MASS INDEX: 25.34 KG/M2 | DIASTOLIC BLOOD PRESSURE: 62 MMHG | HEART RATE: 85 BPM | TEMPERATURE: 98.7 F

## 2025-06-22 DIAGNOSIS — R11.2 NAUSEA AND VOMITING, UNSPECIFIED VOMITING TYPE: ICD-10-CM

## 2025-06-22 DIAGNOSIS — R00.2 HEART PALPITATIONS: Primary | ICD-10-CM

## 2025-06-22 DIAGNOSIS — Z3A.12 12 WEEKS GESTATION OF PREGNANCY: ICD-10-CM

## 2025-06-22 PROCEDURE — 3074F SYST BP LT 130 MM HG: CPT

## 2025-06-22 PROCEDURE — 99215 OFFICE O/P EST HI 40 MIN: CPT

## 2025-06-22 PROCEDURE — 3078F DIAST BP <80 MM HG: CPT

## 2025-06-22 RX ORDER — DIPHENHYDRAMINE HYDROCHLORIDE 25 MG/1
CAPSULE ORAL
COMMUNITY
Start: 2025-06-16

## 2025-06-22 NOTE — PATIENT INSTRUCTIONS
Given your current symptoms and being approximately 12 weeks pregnant; it is advised you proceed to the emergency department for further evaluation and treatment.

## 2025-06-22 NOTE — PROGRESS NOTES
Urgent Care Clinic Visit    Chief Complaint   Patient presents with    Urgent Care     Pt states on 6/11 she went to the ER because of nausea, vomiting, and heart palpitations and they gave her an IV-feeling same sx's                6/22/2025     4:27 PM   Additional Questions   Roomed by Ky   Accompanied by Self

## 2025-06-22 NOTE — PROGRESS NOTES
"ASSESSMENT:  (R00.2) Heart palpitations  (primary encounter diagnosis)    (R11.2) Nausea and vomiting, unspecified vomiting type    (Z3A.12) 12 weeks gestation of pregnancy    PLAN:  Offered to perform an EKG in the clinic for the patient due to the \"heart throbbing\" and discussed prescribing some medication to help manage her nausea and vomiting.  Patient declined and requested an IV.  We discussed that we do not perform IVs in the urgent care.  Patient indicated she would proceed to an emergency department for an IV and further evaluation.  Patient stable to be transported via private transportation to an emergency department for further evaluation and treatment.    The use of Dragon/Power[employer] dictation services may have been used to construct the content in this note; any grammatical or spelling errors are non-intentional. Please contact the author of this note directly if you are in need of any clarification.      Shimon De Leon, JUAN CNP    SUBJECTIVE:  Jessenia Dueñas is a 40 year old female with symptoms of nausea, vomiting and heart throbbing which began about one week ago.    Patient is approximately 12 weeks pregnant  Patient was seen at the emergency department earlier this month for the same symptoms.      ROS:  Negative except noted above.     OBJECTIVE:  GENERAL APPEARANCE: healthy, alert and no distress  EYES: EOMI,  PERRL, conjunctiva clear  RESP: lungs clear to auscultation - no rales, rhonchi or wheezes  CV: regular rates and rhythm, normal S1 S2, no murmur noted  ABDOMEN:  soft, nontender, no HSM or masses and bowel sounds normal  SKIN: no suspicious lesions or rashes  "

## 2025-06-23 ENCOUNTER — APPOINTMENT (OUTPATIENT)
Dept: ULTRASOUND IMAGING | Facility: CLINIC | Age: 40
End: 2025-06-23
Attending: EMERGENCY MEDICINE
Payer: COMMERCIAL

## 2025-06-23 ENCOUNTER — HOSPITAL ENCOUNTER (EMERGENCY)
Facility: CLINIC | Age: 40
Discharge: HOME | End: 2025-06-24
Attending: EMERGENCY MEDICINE
Payer: COMMERCIAL

## 2025-06-23 DIAGNOSIS — O21.0 HYPEREMESIS GRAVIDARUM: ICD-10-CM

## 2025-06-23 DIAGNOSIS — K21.9 GASTROESOPHAGEAL REFLUX DISEASE, UNSPECIFIED WHETHER ESOPHAGITIS PRESENT: ICD-10-CM

## 2025-06-23 DIAGNOSIS — D64.9 ANEMIA, UNSPECIFIED TYPE: ICD-10-CM

## 2025-06-23 DIAGNOSIS — O20.8 SUBCHORIONIC HEMORRHAGE OF PLACENTA IN FIRST TRIMESTER: ICD-10-CM

## 2025-06-23 LAB
ALBUMIN SERPL BCG-MCNC: 4.2 G/DL (ref 3.5–5.2)
ALP SERPL-CCNC: 31 U/L (ref 40–150)
ALT SERPL W P-5'-P-CCNC: 16 U/L (ref 0–50)
ANION GAP SERPL CALCULATED.3IONS-SCNC: 9 MMOL/L (ref 7–15)
AST SERPL W P-5'-P-CCNC: 20 U/L (ref 0–45)
BASOPHILS # BLD AUTO: 0 10E3/UL (ref 0–0.2)
BASOPHILS NFR BLD AUTO: 1 %
BILIRUB SERPL-MCNC: 0.3 MG/DL
BUN SERPL-MCNC: 7.8 MG/DL (ref 6–20)
CALCIUM SERPL-MCNC: 9.7 MG/DL (ref 8.8–10.4)
CHLORIDE SERPL-SCNC: 101 MMOL/L (ref 98–107)
CREAT SERPL-MCNC: 0.47 MG/DL (ref 0.51–0.95)
EGFRCR SERPLBLD CKD-EPI 2021: >90 ML/MIN/1.73M2
EOSINOPHIL # BLD AUTO: 0.1 10E3/UL (ref 0–0.7)
EOSINOPHIL NFR BLD AUTO: 2 %
ERYTHROCYTE [DISTWIDTH] IN BLOOD BY AUTOMATED COUNT: 14.6 % (ref 10–15)
GLUCOSE SERPL-MCNC: 85 MG/DL (ref 70–99)
HCO3 SERPL-SCNC: 23 MMOL/L (ref 22–29)
HCT VFR BLD AUTO: 25.9 % (ref 35–47)
HGB BLD-MCNC: 8.5 G/DL (ref 11.7–15.7)
HOLD SPECIMEN: NORMAL
HOLD SPECIMEN: NORMAL
IMM GRANULOCYTES # BLD: 0 10E3/UL
IMM GRANULOCYTES NFR BLD: 0 %
LIPASE SERPL-CCNC: 21 U/L (ref 13–60)
LYMPHOCYTES # BLD AUTO: 1.5 10E3/UL (ref 0.8–5.3)
LYMPHOCYTES NFR BLD AUTO: 27 %
MCH RBC QN AUTO: 24.5 PG (ref 26.5–33)
MCHC RBC AUTO-ENTMCNC: 32.8 G/DL (ref 31.5–36.5)
MCV RBC AUTO: 75 FL (ref 78–100)
MONOCYTES # BLD AUTO: 0.5 10E3/UL (ref 0–1.3)
MONOCYTES NFR BLD AUTO: 9 %
NEUTROPHILS # BLD AUTO: 3.3 10E3/UL (ref 1.6–8.3)
NEUTROPHILS NFR BLD AUTO: 61 %
NRBC # BLD AUTO: 0 10E3/UL
NRBC BLD AUTO-RTO: 0 /100
PLATELET # BLD AUTO: 239 10E3/UL (ref 150–450)
POTASSIUM SERPL-SCNC: 3.6 MMOL/L (ref 3.4–5.3)
PROT SERPL-MCNC: 7 G/DL (ref 6.4–8.3)
RBC # BLD AUTO: 3.47 10E6/UL (ref 3.8–5.2)
SODIUM SERPL-SCNC: 133 MMOL/L (ref 135–145)
WBC # BLD AUTO: 5.4 10E3/UL (ref 4–11)

## 2025-06-23 PROCEDURE — 76705 ECHO EXAM OF ABDOMEN: CPT

## 2025-06-23 PROCEDURE — 85004 AUTOMATED DIFF WBC COUNT: CPT | Performed by: EMERGENCY MEDICINE

## 2025-06-23 PROCEDURE — 96374 THER/PROPH/DIAG INJ IV PUSH: CPT

## 2025-06-23 PROCEDURE — 83690 ASSAY OF LIPASE: CPT | Performed by: EMERGENCY MEDICINE

## 2025-06-23 PROCEDURE — 76801 OB US < 14 WKS SINGLE FETUS: CPT

## 2025-06-23 PROCEDURE — 36415 COLL VENOUS BLD VENIPUNCTURE: CPT | Performed by: EMERGENCY MEDICINE

## 2025-06-23 PROCEDURE — 96361 HYDRATE IV INFUSION ADD-ON: CPT

## 2025-06-23 PROCEDURE — 82374 ASSAY BLOOD CARBON DIOXIDE: CPT | Performed by: EMERGENCY MEDICINE

## 2025-06-23 PROCEDURE — 258N000003 HC RX IP 258 OP 636: Performed by: EMERGENCY MEDICINE

## 2025-06-23 PROCEDURE — 99285 EMERGENCY DEPT VISIT HI MDM: CPT | Mod: 25

## 2025-06-23 PROCEDURE — 250N000011 HC RX IP 250 OP 636: Performed by: EMERGENCY MEDICINE

## 2025-06-23 PROCEDURE — 250N000013 HC RX MED GY IP 250 OP 250 PS 637: Performed by: EMERGENCY MEDICINE

## 2025-06-23 RX ORDER — DEXTROSE MONOHYDRATE AND SODIUM CHLORIDE 5; .9 G/100ML; G/100ML
INJECTION, SOLUTION INTRAVENOUS CONTINUOUS
Status: ACTIVE | OUTPATIENT
Start: 2025-06-23 | End: 2025-06-23

## 2025-06-23 RX ORDER — ONDANSETRON 4 MG/1
4 TABLET, ORALLY DISINTEGRATING ORAL EVERY 8 HOURS PRN
Qty: 15 TABLET | Refills: 0 | Status: SHIPPED | OUTPATIENT
Start: 2025-06-23 | End: 2025-07-02

## 2025-06-23 RX ORDER — ACETAMINOPHEN 325 MG/1
650 TABLET ORAL ONCE
Status: COMPLETED | OUTPATIENT
Start: 2025-06-23 | End: 2025-06-23

## 2025-06-23 RX ORDER — ONDANSETRON 2 MG/ML
4 INJECTION INTRAMUSCULAR; INTRAVENOUS ONCE
Status: COMPLETED | OUTPATIENT
Start: 2025-06-23 | End: 2025-06-23

## 2025-06-23 RX ORDER — MAGNESIUM HYDROXIDE/ALUMINUM HYDROXICE/SIMETHICONE 120; 1200; 1200 MG/30ML; MG/30ML; MG/30ML
30 SUSPENSION ORAL ONCE
Status: COMPLETED | OUTPATIENT
Start: 2025-06-23 | End: 2025-06-23

## 2025-06-23 RX ORDER — ALUMINA, MAGNESIA, AND SIMETHICONE 2400; 2400; 240 MG/30ML; MG/30ML; MG/30ML
30 SUSPENSION ORAL EVERY 6 HOURS PRN
Qty: 500 ML | Refills: 0 | Status: SHIPPED | OUTPATIENT
Start: 2025-06-23 | End: 2025-07-02

## 2025-06-23 RX ORDER — FAMOTIDINE 20 MG/1
20 TABLET, FILM COATED ORAL ONCE
Status: COMPLETED | OUTPATIENT
Start: 2025-06-23 | End: 2025-06-23

## 2025-06-23 RX ADMIN — ALUMINUM HYDROXIDE, MAGNESIUM HYDROXIDE, AND DIMETHICONE 30 ML: 200; 20; 200 SUSPENSION ORAL at 21:57

## 2025-06-23 RX ADMIN — FAMOTIDINE 20 MG: 20 TABLET, FILM COATED ORAL at 21:57

## 2025-06-23 RX ADMIN — ONDANSETRON 4 MG: 2 INJECTION, SOLUTION INTRAMUSCULAR; INTRAVENOUS at 21:51

## 2025-06-23 RX ADMIN — ACETAMINOPHEN 650 MG: 325 TABLET ORAL at 21:57

## 2025-06-23 RX ADMIN — SODIUM CHLORIDE 1000 ML: 0.9 INJECTION, SOLUTION INTRAVENOUS at 21:51

## 2025-06-23 ASSESSMENT — ACTIVITIES OF DAILY LIVING (ADL)
ADLS_ACUITY_SCORE: 46

## 2025-06-23 ASSESSMENT — COLUMBIA-SUICIDE SEVERITY RATING SCALE - C-SSRS
6. HAVE YOU EVER DONE ANYTHING, STARTED TO DO ANYTHING, OR PREPARED TO DO ANYTHING TO END YOUR LIFE?: NO
2. HAVE YOU ACTUALLY HAD ANY THOUGHTS OF KILLING YOURSELF IN THE PAST MONTH?: NO
1. IN THE PAST MONTH, HAVE YOU WISHED YOU WERE DEAD OR WISHED YOU COULD GO TO SLEEP AND NOT WAKE UP?: NO

## 2025-06-23 NOTE — ED TRIAGE NOTES
PT comes in with nausea/vomiting for the last 2 months. She is 12 weeks pregnant. Vomiting varies 0-3 times per day. This is her 5th pregnancy and she has hx of hyperemesis. Reports abdominal pain during vomiting.  No vaginal bleeding.

## 2025-06-24 VITALS
BODY MASS INDEX: 25.05 KG/M2 | HEART RATE: 83 BPM | HEIGHT: 67 IN | DIASTOLIC BLOOD PRESSURE: 64 MMHG | TEMPERATURE: 98.4 F | WEIGHT: 159.61 LBS | OXYGEN SATURATION: 100 % | SYSTOLIC BLOOD PRESSURE: 111 MMHG | RESPIRATION RATE: 16 BRPM

## 2025-06-24 NOTE — ED PROVIDER NOTES
"  Emergency Department Note      History of Present Illness     Chief Complaint   Nausea & Vomiting      JERICHO Dueñas is a P5 12 week pregnant 40 year old female who presents to the ED for evaluation of nausea and vomiting. Patient reports that she has been feeling nauseous and vomiting for the last two months. The past few days she has been having significant abdominal pain when she stands, and yesterday it became much worse. She is experiencing general abdominal pain, but the worst is her central epigastric pain. Sometimes her chest burns but she takes pain medicine for it. She has also been taking vitamins and antinausea medication, but she has not been taking zofran. She endorses occasional vaginal discharge, and has been having headaches. She denies hematemesis, diarrhea, difficulty urinating, or vaginal bleeding.      History was obtained through the use of a professional [employer] .     Independent Historian   None    Review of External Notes   6/16/25 Ultrasound showed normal first trimester pregnancy  I reviewed the ED note from 6/11/25. Patient was prescribed zofran.  I reviewed the Urgent Care note from yesterday.    Past Medical History     Medical History and Problem List   Gastroesophageal reflux disease     Medications   Pepcid     Physical Exam     Patient Vitals for the past 24 hrs:   BP Temp Temp src Pulse Resp SpO2 Height Weight   06/24/25 0000 111/64 -- -- 83 16 100 % -- --   06/23/25 1854 113/70 98.4  F (36.9  C) Oral 89 18 98 % 1.702 m (5' 7\") 72.4 kg (159 lb 9.8 oz)     Physical Exam    Gen: alert  CV: RRR,  Pulm: breath sounds equal, lungs clear  Abd: Soft, epigastric tenderness, no other tenderness  Back: no evidence of injury, no cva tenderness  MSK: no deformity, moves all extremities  Skin: no rash  Neuro: alert, appropriate conversation and interaction     Diagnostics     Lab Results   Labs Ordered and Resulted from Time of ED Arrival to Time of ED Departure   COMPREHENSIVE " METABOLIC PANEL - Abnormal       Result Value    Sodium 133 (*)     Potassium 3.6      Carbon Dioxide (CO2) 23      Anion Gap 9      Urea Nitrogen 7.8      Creatinine 0.47 (*)     GFR Estimate >90      Calcium 9.7      Chloride 101      Glucose 85      Alkaline Phosphatase 31 (*)     AST 20      ALT 16      Protein Total 7.0      Albumin 4.2      Bilirubin Total 0.3     CBC WITH PLATELETS AND DIFFERENTIAL - Abnormal    WBC Count 5.4      RBC Count 3.47 (*)     Hemoglobin 8.5 (*)     Hematocrit 25.9 (*)     MCV 75 (*)     MCH 24.5 (*)     MCHC 32.8      RDW 14.6      Platelet Count 239      % Neutrophils 61      % Lymphocytes 27      % Monocytes 9      % Eosinophils 2      % Basophils 1      % Immature Granulocytes 0      NRBCs per 100 WBC 0      Absolute Neutrophils 3.3      Absolute Lymphocytes 1.5      Absolute Monocytes 0.5      Absolute Eosinophils 0.1      Absolute Basophils 0.0      Absolute Immature Granulocytes 0.0      Absolute NRBCs 0.0     LIPASE - Normal    Lipase 21         Imaging   US Abdomen Limited   Final Result   IMPRESSION:   1.  Normal limited abdominal ultrasound.            US OB < 14 Weeks Single   Final Result   IMPRESSION:    1.  Single intrauterine gestation with cardiac activity at 13 weeks 0 days, with EDC of 12/29/2025.   2.  Small subchorionic hemorrhage. Attention on follow-up.        Independent Interpretation   None    ED Course      Medications Administered   Medications   dextrose 5% and 0.9% NaCl infusion (has no administration in time range)   sodium chloride 0.9% BOLUS 1,000 mL (0 mLs Intravenous Stopped 6/23/25 2251)   ondansetron (ZOFRAN) injection 4 mg (4 mg Intravenous $Given 6/23/25 2151)   famotidine (PEPCID) tablet 20 mg (20 mg Oral $Given 6/23/25 2157)   acetaminophen (TYLENOL) tablet 650 mg (650 mg Oral $Given 6/23/25 2157)   alum & mag hydroxide-simethicone (MAALOX) suspension 30 mL (30 mLs Oral $Given 6/23/25 2157)       Procedures   Procedures     Discussion of  Management   None    ED Course   ED Course as of 06/24/25 0428   Mon Jun 23, 202523, 2025 2127 I obtained the history and performed the examination as described.    2345 I rechecked and updated the patient.   Patient feeling much better.  Discussed discharge.       Additional Documentation  None    Medical Decision Making / Diagnosis     CMS Diagnoses: None    MIPS   None    [facility]   Jessenia Dueñas is a 40 year old female presents for evaluation of epigastric pain nausea vomiting in early pregnancy.  Patient is struggled with hyperemesis during this pregnancy.  Her abdominal exam is benign here.  Her majority of her tenderness is in the epigastrium.  Patient feeling much improved after receiving Pepcid Maalox and IV fluids here.  Laboratory studies showed no leukocytosis, normal LFTs and lipase.  Right upper ultrasound showed no signs of gallstones.  OB ultrasound showed appropriately developed fetus with intact fetal heart rate.  Incidental subchorionic hemorrhage discussed with patient.  No urinary symptoms to suggest UTI.  Recommended close follow-up with primary OB/GYN.  Patient has had prescription for Zofran early pregnancy did find this helpful we will renew this today.  Encouraged her to continue her Pepcid which she has prescription for.  Will add Maalox as needed.  I did carefully consider other abdominal pathologies such as appendicitis or bowel obstruction however given improvement of her symptoms and lack of focal lower abdominal tenderness I did not feel we needed to further pursue evaluation for this today.  Discussed that if fever increasing pain or other worsening or signs of dehydration please return for reassessment.  Patient expressed understanding.  Discharged home    Disposition   The patient was discharged.     Diagnosis     ICD-10-CM    1. Hyperemesis gravidarum  O21.0       2. Gastroesophageal reflux disease, unspecified whether esophagitis present  K21.9       3. Anemia, unspecified type  D64.9        4. Subchorionic hemorrhage of placenta in first trimester  O20.8            Discharge Medications   Discharge Medication List as of 6/23/2025 11:55 PM        START taking these medications    Details   alum & mag hydroxide-simethicone (MAALOX MAX) 400-400-40 MG/5ML SUSP suspension Take 30 mLs by mouth every 6 hours as needed for indigestion., Disp-500 mL, R-0, E-Prescribe               Scribe Disclosure:  I, Yvan Alexander, am serving as a scribe at 9:24 PM on 6/23/2025 to document services personally performed by Yuki Michel MD based on my observations and the provider's statements to me.        Yuki Michel MD  06/24/25 6270

## 2025-07-02 ENCOUNTER — PRENATAL OFFICE VISIT (OUTPATIENT)
Dept: MIDWIFE SERVICES | Facility: CLINIC | Age: 40
End: 2025-07-02
Payer: COMMERCIAL

## 2025-07-02 ENCOUNTER — RESULTS FOLLOW-UP (OUTPATIENT)
Dept: OBGYN | Facility: CLINIC | Age: 40
End: 2025-07-02

## 2025-07-02 VITALS
HEIGHT: 68 IN | WEIGHT: 156 LBS | DIASTOLIC BLOOD PRESSURE: 62 MMHG | SYSTOLIC BLOOD PRESSURE: 112 MMHG | BODY MASS INDEX: 23.64 KG/M2

## 2025-07-02 DIAGNOSIS — O09.91 HIGH-RISK PREGNANCY IN FIRST TRIMESTER: ICD-10-CM

## 2025-07-02 DIAGNOSIS — O09.529 SUPERVISION OF HIGH-RISK PREGNANCY OF ELDERLY MULTIGRAVIDA: ICD-10-CM

## 2025-07-02 DIAGNOSIS — O99.019 ANTEPARTUM ANEMIA COMPLICATING PREGNANCY: ICD-10-CM

## 2025-07-02 DIAGNOSIS — O21.0 HYPEREMESIS GRAVIDARUM: ICD-10-CM

## 2025-07-02 DIAGNOSIS — Z3A.13 13 WEEKS GESTATION OF PREGNANCY: ICD-10-CM

## 2025-07-02 PROCEDURE — 99207 PR PRENATAL VISIT: CPT | Performed by: ADVANCED PRACTICE MIDWIFE

## 2025-07-02 PROCEDURE — 3078F DIAST BP <80 MM HG: CPT | Performed by: ADVANCED PRACTICE MIDWIFE

## 2025-07-02 PROCEDURE — 0500F INITIAL PRENATAL CARE VISIT: CPT | Performed by: ADVANCED PRACTICE MIDWIFE

## 2025-07-02 PROCEDURE — 3074F SYST BP LT 130 MM HG: CPT | Performed by: ADVANCED PRACTICE MIDWIFE

## 2025-07-02 RX ORDER — LANOLIN ALCOHOL/MO/W.PET/CERES
1000 CREAM (GRAM) TOPICAL
Qty: 90 TABLET | Refills: 3 | Status: SHIPPED | OUTPATIENT
Start: 2025-07-02

## 2025-07-02 RX ORDER — MULTIVIT WITH MINERALS/LUTEIN
250 TABLET ORAL
Qty: 90 TABLET | Refills: 3 | Status: SHIPPED | OUTPATIENT
Start: 2025-07-02

## 2025-07-02 RX ORDER — FERROUS SULFATE 325(65) MG
325 TABLET ORAL
Qty: 90 TABLET | Refills: 3 | Status: SHIPPED | OUTPATIENT
Start: 2025-07-02

## 2025-07-02 RX ORDER — ONDANSETRON 4 MG/1
4 TABLET, FILM COATED ORAL EVERY 8 HOURS PRN
Qty: 30 TABLET | Refills: 2 | Status: SHIPPED | OUTPATIENT
Start: 2025-07-02

## 2025-07-02 RX ORDER — VITAMIN A, VITAMIN C, VITAMIN D-3, VITAMIN E, VITAMIN B-1, VITAMIN B-2, NIACIN, VITAMIN B-6, CALCIUM, IRON, ZINC, COPPER 4000; 120; 400; 22; 1.84; 3; 20; 10; 1; 12; 200; 27; 25; 2 [IU]/1; MG/1; [IU]/1; MG/1; MG/1; MG/1; MG/1; MG/1; MG/1; UG/1; MG/1; MG/1; MG/1; MG/1
1 TABLET ORAL DAILY
Qty: 90 TABLET | Refills: 3 | Status: SHIPPED | OUTPATIENT
Start: 2025-07-02

## 2025-07-02 NOTE — PROGRESS NOTES
13w5d  K[facility] from [place]  Last deliveries in [place] and [place],wanted to switch to have care from women.   Patient feels okay overall. Here alone today. Still throwing up a few times a day and nauseous but much better than she was before, has been sick with all of her babies  Patient's nausea is still present, but managed  Educated about diet, exercise and normal weight gain  Normal to feel movement between 18-22 weeks  Discussed midwifery care and OB GYN, delivery across the street  Prenatal vitamin ordered  Oral iron ordered  Plan to recheck CBC in 4 weeks if no improvement with oral iron, recommend IV iron   Medications reviewed, ob med list given, discussed stopping b6 alone as it is included in generic diclegis  May continue medications for GERD, use tums in addition  Return to clinic 4 weeks    Problems: AMA  Anemia 8.8-oral iron ordered  Hyperemesis-zofran and diclegis  TWG: -1#    [place]  used via monitor for entire visit     JUAN Pacheco, KIMBERLY

## 2025-07-29 ENCOUNTER — OFFICE VISIT (OUTPATIENT)
Dept: MIDWIFE SERVICES | Facility: CLINIC | Age: 40
End: 2025-07-29
Payer: COMMERCIAL

## 2025-07-29 ENCOUNTER — HOSPITAL ENCOUNTER (OUTPATIENT)
Facility: CLINIC | Age: 40
Discharge: HOME | End: 2025-07-29
Payer: COMMERCIAL

## 2025-07-29 ENCOUNTER — HOSPITAL ENCOUNTER (OUTPATIENT)
Facility: CLINIC | Age: 40
End: 2025-07-29
Payer: COMMERCIAL

## 2025-07-29 VITALS — SYSTOLIC BLOOD PRESSURE: 97 MMHG | DIASTOLIC BLOOD PRESSURE: 54 MMHG

## 2025-07-29 VITALS — WEIGHT: 157 LBS | DIASTOLIC BLOOD PRESSURE: 60 MMHG | SYSTOLIC BLOOD PRESSURE: 98 MMHG | BODY MASS INDEX: 23.87 KG/M2

## 2025-07-29 DIAGNOSIS — O09.529 SUPERVISION OF HIGH-RISK PREGNANCY OF ELDERLY MULTIGRAVIDA: Primary | ICD-10-CM

## 2025-07-29 DIAGNOSIS — O99.019 ANTEPARTUM ANEMIA COMPLICATING PREGNANCY: ICD-10-CM

## 2025-07-29 DIAGNOSIS — O09.92 HIGH-RISK PREGNANCY IN SECOND TRIMESTER: ICD-10-CM

## 2025-07-29 DIAGNOSIS — Z3A.17 17 WEEKS GESTATION OF PREGNANCY: ICD-10-CM

## 2025-07-29 DIAGNOSIS — K21.00 GASTROESOPHAGEAL REFLUX DISEASE WITH ESOPHAGITIS WITHOUT HEMORRHAGE: ICD-10-CM

## 2025-07-29 PROCEDURE — 250N000011 HC RX IP 250 OP 636: Performed by: ADVANCED PRACTICE MIDWIFE

## 2025-07-29 PROCEDURE — 96365 THER/PROPH/DIAG IV INF INIT: CPT

## 2025-07-29 PROCEDURE — 999N000105 HC STATISTIC NO DOCUMENTATION TO SUPPORT CHARGE

## 2025-07-29 PROCEDURE — 3078F DIAST BP <80 MM HG: CPT | Performed by: ADVANCED PRACTICE MIDWIFE

## 2025-07-29 PROCEDURE — 3074F SYST BP LT 130 MM HG: CPT | Performed by: ADVANCED PRACTICE MIDWIFE

## 2025-07-29 PROCEDURE — 258N000003 HC RX IP 258 OP 636: Performed by: ADVANCED PRACTICE MIDWIFE

## 2025-07-29 PROCEDURE — 99207 PR PRENATAL VISIT: CPT | Performed by: ADVANCED PRACTICE MIDWIFE

## 2025-07-29 PROCEDURE — 96366 THER/PROPH/DIAG IV INF ADDON: CPT

## 2025-07-29 RX ORDER — METHYLPREDNISOLONE SODIUM SUCCINATE 40 MG/ML
40 INJECTION INTRAMUSCULAR; INTRAVENOUS
Status: DISCONTINUED | OUTPATIENT
Start: 2025-07-29 | End: 2025-07-29 | Stop reason: HOSPADM

## 2025-07-29 RX ORDER — EPINEPHRINE 1 MG/ML
0.3 INJECTION, SOLUTION, CONCENTRATE INTRAVENOUS EVERY 5 MIN PRN
OUTPATIENT
Start: 2025-08-05

## 2025-07-29 RX ORDER — DIPHENHYDRAMINE HYDROCHLORIDE 50 MG/ML
25 INJECTION, SOLUTION INTRAMUSCULAR; INTRAVENOUS
Status: DISCONTINUED | OUTPATIENT
Start: 2025-07-29 | End: 2025-07-29 | Stop reason: HOSPADM

## 2025-07-29 RX ORDER — HEPARIN SODIUM (PORCINE) LOCK FLUSH IV SOLN 100 UNIT/ML 100 UNIT/ML
5 SOLUTION INTRAVENOUS
OUTPATIENT
Start: 2025-08-05

## 2025-07-29 RX ORDER — ALBUTEROL SULFATE 90 UG/1
1-2 INHALANT RESPIRATORY (INHALATION)
Status: DISCONTINUED | OUTPATIENT
Start: 2025-07-29 | End: 2025-07-29 | Stop reason: HOSPADM

## 2025-07-29 RX ORDER — METHYLPREDNISOLONE SODIUM SUCCINATE 40 MG/ML
40 INJECTION INTRAMUSCULAR; INTRAVENOUS
Start: 2025-08-05

## 2025-07-29 RX ORDER — DIPHENHYDRAMINE HYDROCHLORIDE 50 MG/ML
50 INJECTION, SOLUTION INTRAMUSCULAR; INTRAVENOUS
Start: 2025-08-05

## 2025-07-29 RX ORDER — FAMOTIDINE 20 MG/1
20 TABLET, FILM COATED ORAL 2 TIMES DAILY
Qty: 90 TABLET | Refills: 4 | Status: SHIPPED | OUTPATIENT
Start: 2025-07-29

## 2025-07-29 RX ORDER — MEPERIDINE HYDROCHLORIDE 25 MG/ML
25 INJECTION INTRAMUSCULAR; INTRAVENOUS; SUBCUTANEOUS
OUTPATIENT
Start: 2025-08-05

## 2025-07-29 RX ORDER — ALBUTEROL SULFATE 90 UG/1
1-2 INHALANT RESPIRATORY (INHALATION)
Start: 2025-08-05

## 2025-07-29 RX ORDER — DIPHENHYDRAMINE HYDROCHLORIDE 50 MG/ML
25 INJECTION, SOLUTION INTRAMUSCULAR; INTRAVENOUS
Start: 2025-08-05

## 2025-07-29 RX ORDER — DIPHENHYDRAMINE HYDROCHLORIDE 50 MG/ML
50 INJECTION, SOLUTION INTRAMUSCULAR; INTRAVENOUS
Status: DISCONTINUED | OUTPATIENT
Start: 2025-07-29 | End: 2025-07-29 | Stop reason: HOSPADM

## 2025-07-29 RX ORDER — MEPERIDINE HYDROCHLORIDE 25 MG/ML
25 INJECTION INTRAMUSCULAR; INTRAVENOUS; SUBCUTANEOUS
Refills: 0 | Status: DISCONTINUED | OUTPATIENT
Start: 2025-07-29 | End: 2025-07-29 | Stop reason: HOSPADM

## 2025-07-29 RX ORDER — ALBUTEROL SULFATE 0.83 MG/ML
2.5 SOLUTION RESPIRATORY (INHALATION)
OUTPATIENT
Start: 2025-08-05

## 2025-07-29 RX ORDER — HEPARIN SODIUM,PORCINE 10 UNIT/ML
5-20 VIAL (ML) INTRAVENOUS DAILY PRN
OUTPATIENT
Start: 2025-08-05

## 2025-07-29 RX ORDER — ALBUTEROL SULFATE 0.83 MG/ML
2.5 SOLUTION RESPIRATORY (INHALATION)
Status: DISCONTINUED | OUTPATIENT
Start: 2025-07-29 | End: 2025-07-29 | Stop reason: HOSPADM

## 2025-07-29 RX ADMIN — IRON SUCROSE 300 MG: 20 INJECTION, SOLUTION INTRAVENOUS at 11:36

## 2025-07-29 ASSESSMENT — ACTIVITIES OF DAILY LIVING (ADL)
ADLS_ACUITY_SCORE: 20
ADLS_ACUITY_SCORE: 20

## 2025-07-29 NOTE — PLAN OF CARE
Pt arrived for scheduled iron infusion. Verbal orders per Racquel Herndon CNM, with no fetal monitoring needed.  Patient denies uterine contractions, leaking of vaginal fluid/rupture of membranes, vaginal bleeding, abdominal pain, pelvic pressure, nausea, vomiting, headache, visual disturbances, epigastric or RUQ pain, significant edema. Ipad  used during this visit.     Infusion completed at 1300, IV removed.     Pt discharged ambulatory at 1308.

## 2025-07-29 NOTE — DISCHARGE INSTRUCTIONS
Learning About When to Call Your Doctor During Pregnancy (Up to 20 Weeks)  Overview    It's common to have concerns about what might be a problem during your pregnancy. Most pregnancies don't have any serious problems. But it's still important to know when to call your doctor if you have certain symptoms.  These are general suggestions. Your doctor may give you some more information about when to call.  When to call your doctor or midwife (up to 20 weeks)  Call 911  anytime you think you may need emergency care. For example, call if:  You have severe vaginal bleeding. You have soaked through one or more pads in an hour, and the bleeding is not slowing down.  You have chest pain, are short of breath, or cough up blood.  You have sudden, severe pain in your belly that does not go away.  You passed out (lost consciousness).  Call your doctor or midwife now or seek immediate medical care if:  You have a fever.  You have vaginal bleeding.  You are dizzy or lightheaded, or you feel like you may faint.  You have signs of a blood clot in your leg (called a deep vein thrombosis), such as:  Pain in the calf, back of the knee, thigh, or groin.  Swelling in your leg or groin.  A color change on the leg or groin. The skin may be reddish or purplish.  You have symptoms of a urinary tract infection. These may include:  Pain or burning when you urinate.  A frequent need to urinate without being able to pass much urine.  Pain in your low back (below the rib cage and above the waist).  Blood in your urine.  You have belly pain.  You think you are having contractions.  Watch closely for changes in your health, and be sure to contact your doctor or midwife if:  You have vaginal discharge that smells bad.  You feel sad, anxious, or hopeless for more than a few days.  You have other concerns about your pregnancy.  Follow-up care is a key part of your treatment and safety. Be sure to make and go to all appointments, and call your doctor  "if you are having problems. It's also a good idea to know your test results and keep a list of the medicines you take.  Where can you learn more?  Go to https://www.[employer].net/patiented  Enter G674 in the search box to learn more about \"Learning About When to Call Your Doctor During Pregnancy (Up to 20 Weeks).\"  Current as of: April 30, 2024  Content Version: 14.5    4881-5084 [employer].   Care instructions adapted under license by your healthcare professional. If you have questions about a medical condition or this instruction, always ask your healthcare professional. [employer] disclaims any warranty or liability for your use of this information.    "

## 2025-07-29 NOTE — PROGRESS NOTES
17w4d  Feels okay overall. Here alone today. Started feeling movements yesterday! Vomiting less, using zofran. Has not taken iron or prenatal due to it causing nausea.   Fetal movement: present  Denies loss of fluid/vb/contractions/pelvic pain  Discussed genetic testing options NIPS and AFP, will consider after US, she does not remember if things were discussed at 1st OB in [place] because she did not feel well and was very tired  Anatomy ultrasound next visit between 19-21 weeks, order placed, offered [facility] verus here and she would like to do here 1st, understands she may need further follow up with level 2 at [facility]  Refill on famotadine given  Discussed try to take prenatal vitamin if she is feeling better, take with food.  IV iron orders placed, patient can go to [facility] now and prefers no further blood work to be done, does not think she will be able to tolerate oral medications and still has symptoms of fatigue   Return to clinic 4 weeks    Problems: AMA  Anemia 8.8-IV iron   Hyperemesis-zofran  TW     used for entire visit    JUAN Pacheco, KIMBERLY

## 2025-08-04 ENCOUNTER — HOSPITAL ENCOUNTER (OUTPATIENT)
Facility: CLINIC | Age: 40
Discharge: HOME | End: 2025-08-04
Attending: ADVANCED PRACTICE MIDWIFE
Payer: COMMERCIAL

## 2025-08-04 VITALS — DIASTOLIC BLOOD PRESSURE: 57 MMHG | RESPIRATION RATE: 16 BRPM | SYSTOLIC BLOOD PRESSURE: 102 MMHG | TEMPERATURE: 98.1 F

## 2025-08-04 DIAGNOSIS — O99.019 ANTEPARTUM ANEMIA COMPLICATING PREGNANCY: Primary | ICD-10-CM

## 2025-08-04 PROCEDURE — 96365 THER/PROPH/DIAG IV INF INIT: CPT

## 2025-08-04 PROCEDURE — 258N000003 HC RX IP 258 OP 636: Performed by: ADVANCED PRACTICE MIDWIFE

## 2025-08-04 PROCEDURE — 999N000105 HC STATISTIC NO DOCUMENTATION TO SUPPORT CHARGE

## 2025-08-04 PROCEDURE — 96366 THER/PROPH/DIAG IV INF ADDON: CPT

## 2025-08-04 PROCEDURE — 250N000011 HC RX IP 250 OP 636: Performed by: ADVANCED PRACTICE MIDWIFE

## 2025-08-04 RX ORDER — HEPARIN SODIUM,PORCINE 10 UNIT/ML
5-20 VIAL (ML) INTRAVENOUS DAILY PRN
OUTPATIENT
Start: 2025-08-06

## 2025-08-04 RX ORDER — HEPARIN SODIUM,PORCINE 10 UNIT/ML
5-20 VIAL (ML) INTRAVENOUS DAILY PRN
Status: DISCONTINUED | OUTPATIENT
Start: 2025-08-04 | End: 2025-08-04 | Stop reason: HOSPADM

## 2025-08-04 RX ORDER — ALBUTEROL SULFATE 0.83 MG/ML
2.5 SOLUTION RESPIRATORY (INHALATION)
OUTPATIENT
Start: 2025-08-06

## 2025-08-04 RX ORDER — EPINEPHRINE 1 MG/ML
0.3 INJECTION, SOLUTION, CONCENTRATE INTRAVENOUS EVERY 5 MIN PRN
OUTPATIENT
Start: 2025-08-06

## 2025-08-04 RX ORDER — METHYLPREDNISOLONE SODIUM SUCCINATE 40 MG/ML
40 INJECTION INTRAMUSCULAR; INTRAVENOUS
Start: 2025-08-06

## 2025-08-04 RX ORDER — HEPARIN SODIUM (PORCINE) LOCK FLUSH IV SOLN 100 UNIT/ML 100 UNIT/ML
5 SOLUTION INTRAVENOUS
OUTPATIENT
Start: 2025-08-06

## 2025-08-04 RX ORDER — HEPARIN SODIUM (PORCINE) LOCK FLUSH IV SOLN 100 UNIT/ML 100 UNIT/ML
5 SOLUTION INTRAVENOUS
Status: DISCONTINUED | OUTPATIENT
Start: 2025-08-04 | End: 2025-08-04 | Stop reason: HOSPADM

## 2025-08-04 RX ORDER — ALBUTEROL SULFATE 90 UG/1
1-2 INHALANT RESPIRATORY (INHALATION)
Start: 2025-08-06

## 2025-08-04 RX ORDER — MEPERIDINE HYDROCHLORIDE 25 MG/ML
25 INJECTION INTRAMUSCULAR; INTRAVENOUS; SUBCUTANEOUS
OUTPATIENT
Start: 2025-08-06

## 2025-08-04 RX ORDER — DIPHENHYDRAMINE HYDROCHLORIDE 50 MG/ML
50 INJECTION, SOLUTION INTRAMUSCULAR; INTRAVENOUS
Start: 2025-08-06

## 2025-08-04 RX ORDER — DIPHENHYDRAMINE HYDROCHLORIDE 50 MG/ML
25 INJECTION, SOLUTION INTRAMUSCULAR; INTRAVENOUS
Start: 2025-08-06

## 2025-08-04 RX ADMIN — IRON SUCROSE 300 MG: 20 INJECTION, SOLUTION INTRAVENOUS at 11:35

## 2025-08-04 ASSESSMENT — ACTIVITIES OF DAILY LIVING (ADL)
ADLS_ACUITY_SCORE: 20
ADLS_ACUITY_SCORE: 46
ADLS_ACUITY_SCORE: 20

## 2025-08-11 ENCOUNTER — HOSPITAL ENCOUNTER (OUTPATIENT)
Facility: CLINIC | Age: 40
Discharge: HOME | End: 2025-08-11
Attending: ADVANCED PRACTICE MIDWIFE | Admitting: ADVANCED PRACTICE MIDWIFE
Payer: COMMERCIAL

## 2025-08-11 ENCOUNTER — HOSPITAL ENCOUNTER (OUTPATIENT)
Facility: CLINIC | Age: 40
End: 2025-08-11
Admitting: ADVANCED PRACTICE MIDWIFE
Payer: COMMERCIAL

## 2025-08-11 DIAGNOSIS — O99.019 ANTEPARTUM ANEMIA COMPLICATING PREGNANCY: Primary | ICD-10-CM

## 2025-08-11 PROCEDURE — G0463 HOSPITAL OUTPT CLINIC VISIT: HCPCS | Mod: 25

## 2025-08-11 PROCEDURE — 96374 THER/PROPH/DIAG INJ IV PUSH: CPT

## 2025-08-11 PROCEDURE — 96366 THER/PROPH/DIAG IV INF ADDON: CPT

## 2025-08-11 PROCEDURE — 250N000011 HC RX IP 250 OP 636: Performed by: ADVANCED PRACTICE MIDWIFE

## 2025-08-11 PROCEDURE — 999N000105 HC STATISTIC NO DOCUMENTATION TO SUPPORT CHARGE

## 2025-08-11 PROCEDURE — 96365 THER/PROPH/DIAG IV INF INIT: CPT

## 2025-08-11 PROCEDURE — 258N000003 HC RX IP 258 OP 636: Performed by: ADVANCED PRACTICE MIDWIFE

## 2025-08-11 RX ORDER — METHYLPREDNISOLONE SODIUM SUCCINATE 40 MG/ML
40 INJECTION INTRAMUSCULAR; INTRAVENOUS
Status: DISCONTINUED | OUTPATIENT
Start: 2025-08-11 | End: 2025-08-11 | Stop reason: HOSPADM

## 2025-08-11 RX ORDER — DIPHENHYDRAMINE HYDROCHLORIDE 50 MG/ML
25 INJECTION, SOLUTION INTRAMUSCULAR; INTRAVENOUS
Status: DISCONTINUED | OUTPATIENT
Start: 2025-08-11 | End: 2025-08-11 | Stop reason: HOSPADM

## 2025-08-11 RX ORDER — MEPERIDINE HYDROCHLORIDE 25 MG/ML
25 INJECTION INTRAMUSCULAR; INTRAVENOUS; SUBCUTANEOUS
Refills: 0 | Status: DISCONTINUED | OUTPATIENT
Start: 2025-08-11 | End: 2025-08-11 | Stop reason: HOSPADM

## 2025-08-11 RX ORDER — ALBUTEROL SULFATE 0.83 MG/ML
2.5 SOLUTION RESPIRATORY (INHALATION)
Status: DISCONTINUED | OUTPATIENT
Start: 2025-08-11 | End: 2025-08-11 | Stop reason: HOSPADM

## 2025-08-11 RX ORDER — ALBUTEROL SULFATE 90 UG/1
1-2 INHALANT RESPIRATORY (INHALATION)
Status: DISCONTINUED | OUTPATIENT
Start: 2025-08-11 | End: 2025-08-11 | Stop reason: HOSPADM

## 2025-08-11 RX ORDER — DIPHENHYDRAMINE HYDROCHLORIDE 50 MG/ML
50 INJECTION, SOLUTION INTRAMUSCULAR; INTRAVENOUS
Status: DISCONTINUED | OUTPATIENT
Start: 2025-08-11 | End: 2025-08-11 | Stop reason: HOSPADM

## 2025-08-11 RX ADMIN — IRON SUCROSE 300 MG: 20 INJECTION, SOLUTION INTRAVENOUS at 12:31

## 2025-08-11 ASSESSMENT — ACTIVITIES OF DAILY LIVING (ADL)
ADLS_ACUITY_SCORE: 20
ADLS_ACUITY_SCORE: 46
ADLS_ACUITY_SCORE: 20
ADLS_ACUITY_SCORE: 46

## 2025-08-19 ENCOUNTER — PRENATAL OFFICE VISIT (OUTPATIENT)
Dept: MIDWIFE SERVICES | Facility: CLINIC | Age: 40
End: 2025-08-19
Attending: ADVANCED PRACTICE MIDWIFE
Payer: COMMERCIAL

## 2025-08-19 VITALS — WEIGHT: 161 LBS | SYSTOLIC BLOOD PRESSURE: 102 MMHG | DIASTOLIC BLOOD PRESSURE: 60 MMHG | BODY MASS INDEX: 24.48 KG/M2

## 2025-08-19 DIAGNOSIS — O99.012 ANEMIA DURING PREGNANCY IN SECOND TRIMESTER: Primary | ICD-10-CM

## 2025-08-19 DIAGNOSIS — O09.522 AMA (ADVANCED MATERNAL AGE) MULTIGRAVIDA 35+, SECOND TRIMESTER: ICD-10-CM

## 2025-08-19 DIAGNOSIS — O21.9 NAUSEA/VOMITING IN PREGNANCY: ICD-10-CM

## 2025-08-19 DIAGNOSIS — R12 HEARTBURN: ICD-10-CM

## 2025-08-19 DIAGNOSIS — Z3A.20 20 WEEKS GESTATION OF PREGNANCY: ICD-10-CM

## 2025-08-19 DIAGNOSIS — O09.92 ENCOUNTER FOR SUPERVISION OF HIGH RISK PREGNANCY IN SECOND TRIMESTER, ANTEPARTUM: ICD-10-CM

## 2025-08-19 PROCEDURE — 3078F DIAST BP <80 MM HG: CPT | Performed by: ADVANCED PRACTICE MIDWIFE

## 2025-08-19 PROCEDURE — 0502F SUBSEQUENT PRENATAL CARE: CPT | Performed by: ADVANCED PRACTICE MIDWIFE

## 2025-08-19 PROCEDURE — 99207 PR PRENATAL VISIT: CPT | Performed by: ADVANCED PRACTICE MIDWIFE

## 2025-08-19 PROCEDURE — 3074F SYST BP LT 130 MM HG: CPT | Performed by: ADVANCED PRACTICE MIDWIFE

## 2025-08-19 RX ORDER — OMEPRAZOLE 20 MG/1
20 CAPSULE, DELAYED RELEASE ORAL DAILY
Qty: 100 CAPSULE | Refills: 0 | Status: SHIPPED | OUTPATIENT
Start: 2025-08-19

## 2025-09-03 ENCOUNTER — PRENATAL OFFICE VISIT (OUTPATIENT)
Dept: MIDWIFE SERVICES | Facility: CLINIC | Age: 40
End: 2025-09-03
Attending: ADVANCED PRACTICE MIDWIFE
Payer: COMMERCIAL

## 2025-09-03 ENCOUNTER — ANCILLARY PROCEDURE (OUTPATIENT)
Dept: ULTRASOUND IMAGING | Facility: CLINIC | Age: 40
End: 2025-09-03
Attending: ADVANCED PRACTICE MIDWIFE
Payer: COMMERCIAL

## 2025-09-03 VITALS — DIASTOLIC BLOOD PRESSURE: 52 MMHG | BODY MASS INDEX: 24.94 KG/M2 | WEIGHT: 164 LBS | SYSTOLIC BLOOD PRESSURE: 100 MMHG

## 2025-09-03 DIAGNOSIS — O09.529 SUPERVISION OF HIGH-RISK PREGNANCY OF ELDERLY MULTIGRAVIDA: ICD-10-CM

## 2025-09-03 DIAGNOSIS — Z3A.22 22 WEEKS GESTATION OF PREGNANCY: ICD-10-CM

## 2025-09-03 DIAGNOSIS — O09.92 HIGH-RISK PREGNANCY IN SECOND TRIMESTER: ICD-10-CM

## 2025-09-03 DIAGNOSIS — O99.019 ANTEPARTUM ANEMIA COMPLICATING PREGNANCY: Primary | ICD-10-CM

## 2025-09-03 PROCEDURE — 99207 PR PRENATAL VISIT: CPT | Performed by: ADVANCED PRACTICE MIDWIFE

## 2025-09-03 PROCEDURE — 0502F SUBSEQUENT PRENATAL CARE: CPT | Performed by: ADVANCED PRACTICE MIDWIFE

## 2025-09-03 PROCEDURE — 3074F SYST BP LT 130 MM HG: CPT | Performed by: ADVANCED PRACTICE MIDWIFE

## 2025-09-03 PROCEDURE — 3078F DIAST BP <80 MM HG: CPT | Performed by: ADVANCED PRACTICE MIDWIFE
